# Patient Record
Sex: FEMALE | Race: WHITE | NOT HISPANIC OR LATINO | Employment: OTHER | URBAN - METROPOLITAN AREA
[De-identification: names, ages, dates, MRNs, and addresses within clinical notes are randomized per-mention and may not be internally consistent; named-entity substitution may affect disease eponyms.]

---

## 2023-01-01 ENCOUNTER — APPOINTMENT (OUTPATIENT)
Dept: PHYSICAL THERAPY | Facility: CLINIC | Age: 83
DRG: 640 | End: 2023-01-01
Attending: PHYSICIAN ASSISTANT
Payer: MEDICARE

## 2023-01-01 ENCOUNTER — HOSPITAL ENCOUNTER (OUTPATIENT)
Dept: CT IMAGING | Facility: CLINIC | Age: 83
Discharge: HOME | End: 2023-07-12
Attending: RADIOLOGY | Admitting: RADIOLOGY
Payer: MEDICARE

## 2023-01-01 ENCOUNTER — HOSPITAL ENCOUNTER (INPATIENT)
Facility: CLINIC | Age: 83
LOS: 7 days | Discharge: HOSPICE | DRG: 640 | End: 2023-05-16
Attending: EMERGENCY MEDICINE | Admitting: STUDENT IN AN ORGANIZED HEALTH CARE EDUCATION/TRAINING PROGRAM
Payer: MEDICARE

## 2023-01-01 ENCOUNTER — TELEPHONE (OUTPATIENT)
Dept: INTERVENTIONAL RADIOLOGY/VASCULAR | Facility: CLINIC | Age: 83
End: 2023-01-01
Payer: MEDICARE

## 2023-01-01 ENCOUNTER — TRANSCRIBE ORDERS (OUTPATIENT)
Dept: OTHER | Age: 83
End: 2023-01-01

## 2023-01-01 VITALS
HEIGHT: 65 IN | RESPIRATION RATE: 16 BRPM | SYSTOLIC BLOOD PRESSURE: 93 MMHG | TEMPERATURE: 98.3 F | WEIGHT: 145.9 LBS | BODY MASS INDEX: 24.31 KG/M2 | OXYGEN SATURATION: 96 % | HEART RATE: 124 BPM | DIASTOLIC BLOOD PRESSURE: 53 MMHG

## 2023-01-01 DIAGNOSIS — Z87.19 HISTORY OF DIVERTICULAR ABSCESS: ICD-10-CM

## 2023-01-01 DIAGNOSIS — E86.0 DEHYDRATION: ICD-10-CM

## 2023-01-01 DIAGNOSIS — K51.90 CHRONIC ULCERATIVE COLITIS WITHOUT COMPLICATION, UNSPECIFIED LOCATION (H): ICD-10-CM

## 2023-01-01 DIAGNOSIS — R52 PAIN: ICD-10-CM

## 2023-01-01 DIAGNOSIS — Z51.5 HOSPICE CARE PATIENT: Primary | ICD-10-CM

## 2023-01-01 DIAGNOSIS — K57.32 DIVERTICULITIS OF COLON: ICD-10-CM

## 2023-01-01 DIAGNOSIS — R50.9 FEVER, UNSPECIFIED FEVER CAUSE: ICD-10-CM

## 2023-01-01 DIAGNOSIS — I48.21 PERMANENT ATRIAL FIBRILLATION (H): ICD-10-CM

## 2023-01-01 DIAGNOSIS — D64.9 ANEMIA, UNSPECIFIED TYPE: ICD-10-CM

## 2023-01-01 DIAGNOSIS — F41.8 MIXED ANXIETY DEPRESSIVE DISORDER: ICD-10-CM

## 2023-01-01 DIAGNOSIS — Z87.19 HISTORY OF DIVERTICULAR ABSCESS: Primary | ICD-10-CM

## 2023-01-01 DIAGNOSIS — I95.9 HYPOTENSION, UNSPECIFIED HYPOTENSION TYPE: ICD-10-CM

## 2023-01-01 LAB
ABO/RH(D): NORMAL
ALBUMIN SERPL BCG-MCNC: 2.8 G/DL (ref 3.5–5.2)
ALBUMIN UR-MCNC: NEGATIVE MG/DL
ALP SERPL-CCNC: 146 U/L (ref 35–104)
ALT SERPL W P-5'-P-CCNC: 16 U/L (ref 10–35)
ANION GAP SERPL CALCULATED.3IONS-SCNC: 7 MMOL/L (ref 7–15)
ANION GAP SERPL CALCULATED.3IONS-SCNC: 8 MMOL/L (ref 7–15)
ANION GAP SERPL CALCULATED.3IONS-SCNC: 8 MMOL/L (ref 7–15)
ANTIBODY SCREEN: NEGATIVE
APPEARANCE UR: CLEAR
AST SERPL W P-5'-P-CCNC: 23 U/L (ref 10–35)
BACTERIA #/AREA URNS HPF: ABNORMAL /HPF
BACTERIA BLD CULT: NO GROWTH
BACTERIA BLD CULT: NO GROWTH
BASOPHILS # BLD AUTO: 0 10E3/UL (ref 0–0.2)
BASOPHILS # BLD AUTO: 0 10E3/UL (ref 0–0.2)
BASOPHILS # BLD MANUAL: 0 10E3/UL (ref 0–0.2)
BASOPHILS # BLD MANUAL: 0 10E3/UL (ref 0–0.2)
BASOPHILS NFR BLD AUTO: 0 %
BASOPHILS NFR BLD AUTO: 0 %
BASOPHILS NFR BLD MANUAL: 0 %
BASOPHILS NFR BLD MANUAL: 0 %
BILIRUB SERPL-MCNC: 0.5 MG/DL
BILIRUB UR QL STRIP: NEGATIVE
BLD PROD TYP BPU: NORMAL
BLOOD COMPONENT TYPE: NORMAL
BUN SERPL-MCNC: 13.5 MG/DL (ref 8–23)
BUN SERPL-MCNC: 6.4 MG/DL (ref 8–23)
BUN SERPL-MCNC: 9 MG/DL (ref 8–23)
CALCIUM SERPL-MCNC: 8.7 MG/DL (ref 8.8–10.2)
CALCIUM SERPL-MCNC: 8.8 MG/DL (ref 8.8–10.2)
CALCIUM SERPL-MCNC: 9.1 MG/DL (ref 8.8–10.2)
CHLORIDE SERPL-SCNC: 106 MMOL/L (ref 98–107)
CHLORIDE SERPL-SCNC: 109 MMOL/L (ref 98–107)
CHLORIDE SERPL-SCNC: 111 MMOL/L (ref 98–107)
CODING SYSTEM: NORMAL
COLOR UR AUTO: YELLOW
CREAT SERPL-MCNC: 0.53 MG/DL (ref 0.51–0.95)
CREAT SERPL-MCNC: 0.64 MG/DL (ref 0.51–0.95)
CREAT SERPL-MCNC: 0.77 MG/DL (ref 0.51–0.95)
CROSSMATCH: NORMAL
DEPRECATED HCO3 PLAS-SCNC: 21 MMOL/L (ref 22–29)
DEPRECATED HCO3 PLAS-SCNC: 21 MMOL/L (ref 22–29)
DEPRECATED HCO3 PLAS-SCNC: 22 MMOL/L (ref 22–29)
ELLIPTOCYTES BLD QL SMEAR: SLIGHT
ELLIPTOCYTES BLD QL SMEAR: SLIGHT
EOSINOPHIL # BLD AUTO: 0 10E3/UL (ref 0–0.7)
EOSINOPHIL # BLD AUTO: 0 10E3/UL (ref 0–0.7)
EOSINOPHIL # BLD MANUAL: 0 10E3/UL (ref 0–0.7)
EOSINOPHIL # BLD MANUAL: 0 10E3/UL (ref 0–0.7)
EOSINOPHIL NFR BLD AUTO: 0 %
EOSINOPHIL NFR BLD AUTO: 1 %
EOSINOPHIL NFR BLD MANUAL: 0 %
EOSINOPHIL NFR BLD MANUAL: 2 %
ERYTHROCYTE [DISTWIDTH] IN BLOOD BY AUTOMATED COUNT: 20.6 % (ref 10–15)
ERYTHROCYTE [DISTWIDTH] IN BLOOD BY AUTOMATED COUNT: 21.7 % (ref 10–15)
ERYTHROCYTE [DISTWIDTH] IN BLOOD BY AUTOMATED COUNT: 22 % (ref 10–15)
ERYTHROCYTE [DISTWIDTH] IN BLOOD BY AUTOMATED COUNT: 22.1 % (ref 10–15)
ERYTHROCYTE [DISTWIDTH] IN BLOOD BY AUTOMATED COUNT: 22.3 % (ref 10–15)
GFR SERPL CREATININE-BSD FRML MDRD: 75 ML/MIN/1.73M2
GFR SERPL CREATININE-BSD FRML MDRD: 86 ML/MIN/1.73M2
GFR SERPL CREATININE-BSD FRML MDRD: 90 ML/MIN/1.73M2
GLUCOSE SERPL-MCNC: 106 MG/DL (ref 70–99)
GLUCOSE SERPL-MCNC: 128 MG/DL (ref 70–99)
GLUCOSE SERPL-MCNC: 94 MG/DL (ref 70–99)
GLUCOSE UR STRIP-MCNC: NEGATIVE MG/DL
HCT VFR BLD AUTO: 21.7 % (ref 35–47)
HCT VFR BLD AUTO: 23.1 % (ref 35–47)
HCT VFR BLD AUTO: 24.4 % (ref 35–47)
HCT VFR BLD AUTO: 24.4 % (ref 35–47)
HCT VFR BLD AUTO: 24.5 % (ref 35–47)
HGB BLD-MCNC: 6.9 G/DL (ref 11.7–15.7)
HGB BLD-MCNC: 7.3 G/DL (ref 11.7–15.7)
HGB BLD-MCNC: 7.6 G/DL (ref 11.7–15.7)
HGB BLD-MCNC: 7.8 G/DL (ref 11.7–15.7)
HGB BLD-MCNC: 8 G/DL (ref 11.7–15.7)
HGB UR QL STRIP: ABNORMAL
IMM GRANULOCYTES # BLD: 0 10E3/UL
IMM GRANULOCYTES # BLD: 0 10E3/UL
IMM GRANULOCYTES NFR BLD: 1 %
IMM GRANULOCYTES NFR BLD: 2 %
ISSUE DATE AND TIME: NORMAL
KETONES UR STRIP-MCNC: NEGATIVE MG/DL
LACTATE SERPL-SCNC: 0.9 MMOL/L (ref 0.7–2)
LACTATE SERPL-SCNC: 0.9 MMOL/L (ref 0.7–2)
LACTATE SERPL-SCNC: 1.2 MMOL/L (ref 0.7–2)
LACTATE SERPL-SCNC: 1.2 MMOL/L (ref 0.7–2)
LACTATE SERPL-SCNC: 1.3 MMOL/L (ref 0.7–2)
LEUKOCYTE ESTERASE UR QL STRIP: NEGATIVE
LYMPHOCYTES # BLD AUTO: 0.5 10E3/UL (ref 0.8–5.3)
LYMPHOCYTES # BLD AUTO: 0.8 10E3/UL (ref 0.8–5.3)
LYMPHOCYTES # BLD MANUAL: 0.8 10E3/UL (ref 0.8–5.3)
LYMPHOCYTES # BLD MANUAL: 0.8 10E3/UL (ref 0.8–5.3)
LYMPHOCYTES NFR BLD AUTO: 35 %
LYMPHOCYTES NFR BLD AUTO: 67 %
LYMPHOCYTES NFR BLD MANUAL: 56 %
LYMPHOCYTES NFR BLD MANUAL: 63 %
MCH RBC QN AUTO: 30.3 PG (ref 26.5–33)
MCH RBC QN AUTO: 30.5 PG (ref 26.5–33)
MCH RBC QN AUTO: 30.6 PG (ref 26.5–33)
MCH RBC QN AUTO: 31.3 PG (ref 26.5–33)
MCH RBC QN AUTO: 31.9 PG (ref 26.5–33)
MCHC RBC AUTO-ENTMCNC: 31.1 G/DL (ref 31.5–36.5)
MCHC RBC AUTO-ENTMCNC: 31.6 G/DL (ref 31.5–36.5)
MCHC RBC AUTO-ENTMCNC: 31.8 G/DL (ref 31.5–36.5)
MCHC RBC AUTO-ENTMCNC: 31.8 G/DL (ref 31.5–36.5)
MCHC RBC AUTO-ENTMCNC: 32.8 G/DL (ref 31.5–36.5)
MCV RBC AUTO: 101 FL (ref 78–100)
MCV RBC AUTO: 95 FL (ref 78–100)
MCV RBC AUTO: 96 FL (ref 78–100)
MCV RBC AUTO: 97 FL (ref 78–100)
MCV RBC AUTO: 97 FL (ref 78–100)
MONOCYTES # BLD AUTO: 0.1 10E3/UL (ref 0–1.3)
MONOCYTES # BLD AUTO: 0.2 10E3/UL (ref 0–1.3)
MONOCYTES # BLD MANUAL: 0.1 10E3/UL (ref 0–1.3)
MONOCYTES # BLD MANUAL: 0.1 10E3/UL (ref 0–1.3)
MONOCYTES NFR BLD AUTO: 11 %
MONOCYTES NFR BLD AUTO: 15 %
MONOCYTES NFR BLD MANUAL: 10 %
MONOCYTES NFR BLD MANUAL: 6 %
MUCOUS THREADS #/AREA URNS LPF: PRESENT /LPF
NEUTROPHILS # BLD AUTO: 0.3 10E3/UL (ref 1.6–8.3)
NEUTROPHILS # BLD AUTO: 0.6 10E3/UL (ref 1.6–8.3)
NEUTROPHILS # BLD MANUAL: 0.3 10E3/UL (ref 1.6–8.3)
NEUTROPHILS # BLD MANUAL: 0.5 10E3/UL (ref 1.6–8.3)
NEUTROPHILS NFR BLD AUTO: 20 %
NEUTROPHILS NFR BLD AUTO: 48 %
NEUTROPHILS NFR BLD MANUAL: 27 %
NEUTROPHILS NFR BLD MANUAL: 36 %
NITRATE UR QL: NEGATIVE
NRBC # BLD AUTO: 0 10E3/UL
NRBC BLD AUTO-RTO: 0 /100
NRBC BLD AUTO-RTO: 0 /100
NRBC BLD MANUAL-RTO: 1 %
PATH REV: ABNORMAL
PH UR STRIP: 5 [PH] (ref 5–7)
PLAT MORPH BLD: ABNORMAL
PLAT MORPH BLD: ABNORMAL
PLATELET # BLD AUTO: 199 10E3/UL (ref 150–450)
PLATELET # BLD AUTO: 220 10E3/UL (ref 150–450)
PLATELET # BLD AUTO: 234 10E3/UL (ref 150–450)
PLATELET # BLD AUTO: 254 10E3/UL (ref 150–450)
PLATELET # BLD AUTO: 256 10E3/UL (ref 150–450)
POTASSIUM SERPL-SCNC: 3.6 MMOL/L (ref 3.4–5.3)
POTASSIUM SERPL-SCNC: 4 MMOL/L (ref 3.4–5.3)
POTASSIUM SERPL-SCNC: 5 MMOL/L (ref 3.4–5.3)
PROT SERPL-MCNC: 6.1 G/DL (ref 6.4–8.3)
RBC # BLD AUTO: 2.16 10E6/UL (ref 3.8–5.2)
RBC # BLD AUTO: 2.39 10E6/UL (ref 3.8–5.2)
RBC # BLD AUTO: 2.51 10E6/UL (ref 3.8–5.2)
RBC # BLD AUTO: 2.55 10E6/UL (ref 3.8–5.2)
RBC # BLD AUTO: 2.56 10E6/UL (ref 3.8–5.2)
RBC MORPH BLD: ABNORMAL
RBC MORPH BLD: ABNORMAL
RBC URINE: 1 /HPF
SMUDGE CELLS BLD QL SMEAR: PRESENT
SODIUM SERPL-SCNC: 135 MMOL/L (ref 136–145)
SODIUM SERPL-SCNC: 139 MMOL/L (ref 136–145)
SODIUM SERPL-SCNC: 139 MMOL/L (ref 136–145)
SP GR UR STRIP: 1.01 (ref 1–1.03)
SPECIMEN EXPIRATION DATE: NORMAL
SQUAMOUS EPITHELIAL: 1 /HPF
TROPONIN T SERPL HS-MCNC: 23 NG/L
UNIT ABO/RH: NORMAL
UNIT NUMBER: NORMAL
UNIT STATUS: NORMAL
UNIT TYPE ISBT: 6200
UROBILINOGEN UR STRIP-MCNC: NORMAL MG/DL
VARIANT LYMPHS BLD QL SMEAR: PRESENT
WBC # BLD AUTO: 1.1 10E3/UL (ref 4–11)
WBC # BLD AUTO: 1.2 10E3/UL (ref 4–11)
WBC # BLD AUTO: 1.3 10E3/UL (ref 4–11)
WBC # BLD AUTO: 1.3 10E3/UL (ref 4–11)
WBC # BLD AUTO: 1.4 10E3/UL (ref 4–11)
WBC URINE: 2 /HPF

## 2023-01-01 PROCEDURE — 36415 COLL VENOUS BLD VENIPUNCTURE: CPT | Performed by: HOSPITALIST

## 2023-01-01 PROCEDURE — 87040 BLOOD CULTURE FOR BACTERIA: CPT | Performed by: STUDENT IN AN ORGANIZED HEALTH CARE EDUCATION/TRAINING PROGRAM

## 2023-01-01 PROCEDURE — 83605 ASSAY OF LACTIC ACID: CPT | Performed by: HOSPITALIST

## 2023-01-01 PROCEDURE — 96361 HYDRATE IV INFUSION ADD-ON: CPT

## 2023-01-01 PROCEDURE — 250N000013 HC RX MED GY IP 250 OP 250 PS 637: Performed by: STUDENT IN AN ORGANIZED HEALTH CARE EDUCATION/TRAINING PROGRAM

## 2023-01-01 PROCEDURE — 36415 COLL VENOUS BLD VENIPUNCTURE: CPT | Performed by: PHYSICIAN ASSISTANT

## 2023-01-01 PROCEDURE — 36415 COLL VENOUS BLD VENIPUNCTURE: CPT | Performed by: EMERGENCY MEDICINE

## 2023-01-01 PROCEDURE — 99233 SBSQ HOSP IP/OBS HIGH 50: CPT | Performed by: HOSPITALIST

## 2023-01-01 PROCEDURE — 85027 COMPLETE CBC AUTOMATED: CPT | Performed by: HOSPITALIST

## 2023-01-01 PROCEDURE — 250N000011 HC RX IP 250 OP 636: Performed by: HOSPITALIST

## 2023-01-01 PROCEDURE — 81001 URINALYSIS AUTO W/SCOPE: CPT | Performed by: EMERGENCY MEDICINE

## 2023-01-01 PROCEDURE — 250N000013 HC RX MED GY IP 250 OP 250 PS 637: Performed by: HOSPITALIST

## 2023-01-01 PROCEDURE — 250N000011 HC RX IP 250 OP 636: Performed by: EMERGENCY MEDICINE

## 2023-01-01 PROCEDURE — 99285 EMERGENCY DEPT VISIT HI MDM: CPT | Mod: 25

## 2023-01-01 PROCEDURE — 85025 COMPLETE CBC W/AUTO DIFF WBC: CPT | Performed by: HOSPITALIST

## 2023-01-01 PROCEDURE — 84484 ASSAY OF TROPONIN QUANT: CPT | Performed by: EMERGENCY MEDICINE

## 2023-01-01 PROCEDURE — 250N000013 HC RX MED GY IP 250 OP 250 PS 637: Performed by: NURSE PRACTITIONER

## 2023-01-01 PROCEDURE — 80053 COMPREHEN METABOLIC PANEL: CPT | Performed by: EMERGENCY MEDICINE

## 2023-01-01 PROCEDURE — 120N000001 HC R&B MED SURG/OB

## 2023-01-01 PROCEDURE — P9016 RBC LEUKOCYTES REDUCED: HCPCS | Performed by: EMERGENCY MEDICINE

## 2023-01-01 PROCEDURE — G1010 CDSM STANSON: HCPCS

## 2023-01-01 PROCEDURE — 99223 1ST HOSP IP/OBS HIGH 75: CPT | Mod: AI | Performed by: STUDENT IN AN ORGANIZED HEALTH CARE EDUCATION/TRAINING PROGRAM

## 2023-01-01 PROCEDURE — 83605 ASSAY OF LACTIC ACID: CPT | Performed by: PHYSICIAN ASSISTANT

## 2023-01-01 PROCEDURE — 250N000013 HC RX MED GY IP 250 OP 250 PS 637: Performed by: PHYSICIAN ASSISTANT

## 2023-01-01 PROCEDURE — 86923 COMPATIBILITY TEST ELECTRIC: CPT | Performed by: EMERGENCY MEDICINE

## 2023-01-01 PROCEDURE — 85007 BL SMEAR W/DIFF WBC COUNT: CPT | Performed by: HOSPITALIST

## 2023-01-01 PROCEDURE — 99222 1ST HOSP IP/OBS MODERATE 55: CPT | Performed by: NURSE PRACTITIONER

## 2023-01-01 PROCEDURE — 36415 COLL VENOUS BLD VENIPUNCTURE: CPT | Performed by: STUDENT IN AN ORGANIZED HEALTH CARE EDUCATION/TRAINING PROGRAM

## 2023-01-01 PROCEDURE — 99231 SBSQ HOSP IP/OBS SF/LOW 25: CPT | Performed by: PHYSICIAN ASSISTANT

## 2023-01-01 PROCEDURE — 96360 HYDRATION IV INFUSION INIT: CPT

## 2023-01-01 PROCEDURE — 85025 COMPLETE CBC W/AUTO DIFF WBC: CPT | Performed by: EMERGENCY MEDICINE

## 2023-01-01 PROCEDURE — 86850 RBC ANTIBODY SCREEN: CPT | Performed by: EMERGENCY MEDICINE

## 2023-01-01 PROCEDURE — 80048 BASIC METABOLIC PNL TOTAL CA: CPT | Performed by: HOSPITALIST

## 2023-01-01 PROCEDURE — 83605 ASSAY OF LACTIC ACID: CPT | Performed by: EMERGENCY MEDICINE

## 2023-01-01 PROCEDURE — 99239 HOSP IP/OBS DSCHRG MGMT >30: CPT | Performed by: PHYSICIAN ASSISTANT

## 2023-01-01 PROCEDURE — 80048 BASIC METABOLIC PNL TOTAL CA: CPT | Performed by: STUDENT IN AN ORGANIZED HEALTH CARE EDUCATION/TRAINING PROGRAM

## 2023-01-01 PROCEDURE — 85027 COMPLETE CBC AUTOMATED: CPT | Performed by: STUDENT IN AN ORGANIZED HEALTH CARE EDUCATION/TRAINING PROGRAM

## 2023-01-01 PROCEDURE — 97530 THERAPEUTIC ACTIVITIES: CPT | Mod: GP | Performed by: PHYSICAL THERAPIST

## 2023-01-01 PROCEDURE — 258N000003 HC RX IP 258 OP 636: Performed by: EMERGENCY MEDICINE

## 2023-01-01 PROCEDURE — 99232 SBSQ HOSP IP/OBS MODERATE 35: CPT | Performed by: NURSE PRACTITIONER

## 2023-01-01 PROCEDURE — 97161 PT EVAL LOW COMPLEX 20 MIN: CPT | Mod: GP | Performed by: PHYSICAL THERAPIST

## 2023-01-01 PROCEDURE — 99497 ADVNCD CARE PLAN 30 MIN: CPT | Mod: 25 | Performed by: NURSE PRACTITIONER

## 2023-01-01 PROCEDURE — 250N000009 HC RX 250: Performed by: RADIOLOGY

## 2023-01-01 RX ORDER — LEVOFLOXACIN 500 MG/1
500 TABLET, FILM COATED ORAL DAILY
Status: ON HOLD | COMMUNITY
End: 2023-01-01

## 2023-01-01 RX ORDER — LORAZEPAM 2 MG/ML
1 INJECTION INTRAMUSCULAR
Status: DISCONTINUED | OUTPATIENT
Start: 2023-01-01 | End: 2023-01-01 | Stop reason: HOSPADM

## 2023-01-01 RX ORDER — HEPARIN SODIUM 5000 [USP'U]/.5ML
5000 INJECTION, SOLUTION INTRAVENOUS; SUBCUTANEOUS EVERY 8 HOURS
Status: DISCONTINUED | OUTPATIENT
Start: 2023-01-01 | End: 2023-01-01 | Stop reason: HOSPADM

## 2023-01-01 RX ORDER — VANCOMYCIN HYDROCHLORIDE 125 MG/1
125 CAPSULE ORAL 2 TIMES DAILY
Status: DISCONTINUED | OUTPATIENT
Start: 2023-01-01 | End: 2023-01-01

## 2023-01-01 RX ORDER — ONDANSETRON 2 MG/ML
4 INJECTION INTRAMUSCULAR; INTRAVENOUS EVERY 6 HOURS PRN
Status: DISCONTINUED | OUTPATIENT
Start: 2023-01-01 | End: 2023-01-01 | Stop reason: HOSPADM

## 2023-01-01 RX ORDER — HALOPERIDOL 2 MG/ML
1 SOLUTION ORAL
Qty: 15 ML | Refills: 0 | Status: CANCELLED | OUTPATIENT
Start: 2023-01-01

## 2023-01-01 RX ORDER — MORPHINE SULFATE 20 MG/ML
2.5-5 SOLUTION ORAL
Qty: 30 ML | Refills: 0 | Status: SHIPPED | OUTPATIENT
Start: 2023-01-01 | End: 2023-01-01

## 2023-01-01 RX ORDER — MESALAMINE 1.2 G/1
2.4 TABLET, DELAYED RELEASE ORAL DAILY
Status: DISCONTINUED | OUTPATIENT
Start: 2023-01-01 | End: 2023-01-01 | Stop reason: HOSPADM

## 2023-01-01 RX ORDER — BISACODYL 10 MG
10 SUPPOSITORY, RECTAL RECTAL DAILY PRN
Qty: 2 SUPPOSITORY | Refills: 0 | Status: CANCELLED | OUTPATIENT
Start: 2023-01-01

## 2023-01-01 RX ORDER — LORAZEPAM 0.5 MG/1
1 TABLET ORAL EVERY 4 HOURS PRN
Qty: 15 TABLET | Refills: 0 | Status: SHIPPED | OUTPATIENT
Start: 2023-01-01 | End: 2023-01-01

## 2023-01-01 RX ORDER — VANCOMYCIN HYDROCHLORIDE 125 MG/1
125 CAPSULE ORAL 2 TIMES DAILY
Status: DISCONTINUED | OUTPATIENT
Start: 2023-01-01 | End: 2023-01-01 | Stop reason: HOSPADM

## 2023-01-01 RX ORDER — ONDANSETRON 4 MG/1
4 TABLET, FILM COATED ORAL EVERY 8 HOURS PRN
Status: ON HOLD | COMMUNITY
Start: 2023-01-01 | End: 2023-01-01

## 2023-01-01 RX ORDER — ONDANSETRON 4 MG/1
4 TABLET, ORALLY DISINTEGRATING ORAL EVERY 6 HOURS PRN
Qty: 15 TABLET | Refills: 0 | Status: CANCELLED | OUTPATIENT
Start: 2023-01-01

## 2023-01-01 RX ORDER — HALOPERIDOL 0.5 MG/1
0.5 TABLET ORAL EVERY 6 HOURS PRN
Qty: 15 TABLET | Refills: 0 | DISCHARGE
Start: 2023-01-01

## 2023-01-01 RX ORDER — ONDANSETRON 4 MG/1
4 TABLET, ORALLY DISINTEGRATING ORAL EVERY 6 HOURS PRN
Status: DISCONTINUED | OUTPATIENT
Start: 2023-01-01 | End: 2023-01-01 | Stop reason: HOSPADM

## 2023-01-01 RX ORDER — METOPROLOL TARTRATE 25 MG/1
12.5 TABLET, FILM COATED ORAL 2 TIMES DAILY
DISCHARGE
Start: 2023-01-01 | End: 2023-01-01

## 2023-01-01 RX ORDER — SODIUM CHLORIDE 9 MG/ML
INJECTION, SOLUTION INTRAVENOUS CONTINUOUS
Status: DISCONTINUED | OUTPATIENT
Start: 2023-01-01 | End: 2023-01-01

## 2023-01-01 RX ORDER — MORPHINE SULFATE 20 MG/ML
2.5 SOLUTION ORAL
Qty: 30 ML | Refills: 0 | Status: CANCELLED | OUTPATIENT
Start: 2023-01-01

## 2023-01-01 RX ORDER — POTASSIUM CHLORIDE 1500 MG/1
20 TABLET, EXTENDED RELEASE ORAL 2 TIMES DAILY
Status: ON HOLD | COMMUNITY
Start: 2023-01-01 | End: 2023-01-01

## 2023-01-01 RX ORDER — LORAZEPAM 0.5 MG/1
0.5 TABLET ORAL EVERY 4 HOURS PRN
Qty: 15 TABLET | Refills: 0 | Status: SHIPPED | OUTPATIENT
Start: 2023-01-01

## 2023-01-01 RX ORDER — ACETAMINOPHEN 325 MG/1
975 TABLET ORAL EVERY 4 HOURS PRN
Status: DISCONTINUED | OUTPATIENT
Start: 2023-01-01 | End: 2023-01-01 | Stop reason: HOSPADM

## 2023-01-01 RX ORDER — MESALAMINE 1.2 G/1
2 TABLET, DELAYED RELEASE ORAL DAILY
Status: ON HOLD | COMMUNITY
Start: 2023-01-01 | End: 2023-01-01

## 2023-01-01 RX ORDER — HALOPERIDOL 2 MG/ML
1 SOLUTION ORAL
Status: DISCONTINUED | OUTPATIENT
Start: 2023-01-01 | End: 2023-01-01 | Stop reason: HOSPADM

## 2023-01-01 RX ORDER — CITALOPRAM HYDROBROMIDE 20 MG/1
20 TABLET ORAL AT BEDTIME
Status: ON HOLD | COMMUNITY
Start: 2023-01-01 | End: 2023-01-01

## 2023-01-01 RX ORDER — ONDANSETRON 2 MG/ML
4 INJECTION INTRAMUSCULAR; INTRAVENOUS EVERY 30 MIN PRN
Status: DISCONTINUED | OUTPATIENT
Start: 2023-01-01 | End: 2023-01-01

## 2023-01-01 RX ORDER — BISACODYL 10 MG
10 SUPPOSITORY, RECTAL RECTAL
Status: DISCONTINUED | OUTPATIENT
Start: 2023-01-01 | End: 2023-01-01 | Stop reason: HOSPADM

## 2023-01-01 RX ORDER — ACETAMINOPHEN 650 MG/1
650 SUPPOSITORY RECTAL EVERY 4 HOURS PRN
Qty: 2 SUPPOSITORY | Refills: 0 | DISCHARGE
Start: 2023-01-01

## 2023-01-01 RX ORDER — ACETAMINOPHEN 650 MG/1
650 SUPPOSITORY RECTAL EVERY 4 HOURS PRN
Qty: 2 SUPPOSITORY | Refills: 0 | Status: CANCELLED | OUTPATIENT
Start: 2023-01-01

## 2023-01-01 RX ORDER — ATROPINE SULFATE 10 MG/ML
2 SOLUTION/ DROPS OPHTHALMIC
Qty: 2 ML | Refills: 0 | DISCHARGE
Start: 2023-01-01

## 2023-01-01 RX ORDER — LIDOCAINE 40 MG/G
CREAM TOPICAL
Status: DISCONTINUED | OUTPATIENT
Start: 2023-01-01 | End: 2023-01-01 | Stop reason: HOSPADM

## 2023-01-01 RX ORDER — NALOXONE HYDROCHLORIDE 0.4 MG/ML
0.1 INJECTION, SOLUTION INTRAMUSCULAR; INTRAVENOUS; SUBCUTANEOUS
Status: DISCONTINUED | OUTPATIENT
Start: 2023-01-01 | End: 2023-01-01 | Stop reason: HOSPADM

## 2023-01-01 RX ORDER — LORAZEPAM 1 MG/1
1 TABLET ORAL
Qty: 15 TABLET | Refills: 0 | Status: CANCELLED | OUTPATIENT
Start: 2023-01-01

## 2023-01-01 RX ORDER — BISACODYL 10 MG
10 SUPPOSITORY, RECTAL RECTAL
Qty: 2 SUPPOSITORY | Refills: 0 | DISCHARGE
Start: 2023-01-01

## 2023-01-01 RX ORDER — CITALOPRAM HYDROBROMIDE 10 MG/1
20 TABLET ORAL DAILY
Status: DISCONTINUED | OUTPATIENT
Start: 2023-01-01 | End: 2023-01-01 | Stop reason: HOSPADM

## 2023-01-01 RX ORDER — CALCIUM CARBONATE/VITAMIN D3 600 MG-10
1 TABLET ORAL 2 TIMES DAILY
Status: ON HOLD | COMMUNITY
End: 2023-01-01

## 2023-01-01 RX ORDER — ATROPINE SULFATE 10 MG/ML
2 SOLUTION/ DROPS OPHTHALMIC EVERY 4 HOURS PRN
Status: DISCONTINUED | OUTPATIENT
Start: 2023-01-01 | End: 2023-01-01 | Stop reason: HOSPADM

## 2023-01-01 RX ORDER — ONDANSETRON 4 MG/1
4 TABLET, ORALLY DISINTEGRATING ORAL EVERY 6 HOURS PRN
Qty: 15 TABLET | Refills: 0 | DISCHARGE
Start: 2023-01-01

## 2023-01-01 RX ORDER — FLUCONAZOLE 200 MG/1
200 TABLET ORAL DAILY
Status: ON HOLD | COMMUNITY
End: 2023-01-01

## 2023-01-01 RX ORDER — ACETAMINOPHEN 650 MG/1
650 SUPPOSITORY RECTAL EVERY 4 HOURS PRN
Status: DISCONTINUED | OUTPATIENT
Start: 2023-01-01 | End: 2023-01-01 | Stop reason: HOSPADM

## 2023-01-01 RX ORDER — LORAZEPAM 1 MG/1
1 TABLET ORAL
Status: DISCONTINUED | OUTPATIENT
Start: 2023-01-01 | End: 2023-01-01 | Stop reason: HOSPADM

## 2023-01-01 RX ORDER — ACETAMINOPHEN 325 MG/1
975 TABLET ORAL EVERY 4 HOURS PRN
Qty: 30 TABLET | Refills: 0 | DISCHARGE
Start: 2023-01-01

## 2023-01-01 RX ORDER — VANCOMYCIN HYDROCHLORIDE 125 MG/1
125 CAPSULE ORAL
Status: ON HOLD | COMMUNITY
Start: 2023-01-01 | End: 2023-01-01

## 2023-01-01 RX ORDER — VANCOMYCIN HYDROCHLORIDE 125 MG/1
125 CAPSULE ORAL 2 TIMES DAILY
DISCHARGE
Start: 2023-01-01

## 2023-01-01 RX ORDER — METOPROLOL TARTRATE 25 MG/1
12.5 TABLET, FILM COATED ORAL 2 TIMES DAILY
DISCHARGE
Start: 2023-01-01

## 2023-01-01 RX ORDER — LOPERAMIDE HCL 2 MG
2 CAPSULE ORAL 4 TIMES DAILY PRN
Status: ON HOLD | COMMUNITY
Start: 2023-01-01 | End: 2023-01-01

## 2023-01-01 RX ORDER — MESALAMINE 1.2 G/1
2.4 TABLET, DELAYED RELEASE ORAL DAILY
DISCHARGE
Start: 2023-01-01

## 2023-01-01 RX ORDER — METOPROLOL TARTRATE 100 MG
100 TABLET ORAL 2 TIMES DAILY
Status: ON HOLD | COMMUNITY
Start: 2023-01-01 | End: 2023-01-01

## 2023-01-01 RX ORDER — ATROPINE SULFATE 10 MG/ML
2 SOLUTION/ DROPS OPHTHALMIC
Qty: 2 ML | Refills: 0 | Status: CANCELLED | OUTPATIENT
Start: 2023-01-01

## 2023-01-01 RX ORDER — ACYCLOVIR 400 MG/1
400 TABLET ORAL 2 TIMES DAILY
Status: ON HOLD | COMMUNITY
End: 2023-01-01

## 2023-01-01 RX ORDER — NALOXONE HYDROCHLORIDE 0.4 MG/ML
0.2 INJECTION, SOLUTION INTRAMUSCULAR; INTRAVENOUS; SUBCUTANEOUS
Status: DISCONTINUED | OUTPATIENT
Start: 2023-01-01 | End: 2023-01-01 | Stop reason: HOSPADM

## 2023-01-01 RX ORDER — HYDROCHLOROTHIAZIDE 25 MG/1
TABLET ORAL
Status: ON HOLD | COMMUNITY
Start: 2023-01-01 | End: 2023-01-01

## 2023-01-01 RX ORDER — RIVAROXABAN 20 MG/1
20 TABLET, FILM COATED ORAL
Status: ON HOLD | COMMUNITY
Start: 2023-01-01 | End: 2023-01-01

## 2023-01-01 RX ORDER — SODIUM CHLORIDE, SODIUM LACTATE, POTASSIUM CHLORIDE, CALCIUM CHLORIDE 600; 310; 30; 20 MG/100ML; MG/100ML; MG/100ML; MG/100ML
INJECTION, SOLUTION INTRAVENOUS CONTINUOUS
Status: DISCONTINUED | OUTPATIENT
Start: 2023-01-01 | End: 2023-01-01

## 2023-01-01 RX ORDER — CITALOPRAM HYDROBROMIDE 20 MG/1
20 TABLET ORAL AT BEDTIME
DISCHARGE
Start: 2023-01-01

## 2023-01-01 RX ORDER — MORPHINE SULFATE 20 MG/ML
2.5-5 SOLUTION ORAL
Status: DISCONTINUED | OUTPATIENT
Start: 2023-01-01 | End: 2023-01-01 | Stop reason: HOSPADM

## 2023-01-01 RX ORDER — MORPHINE SULFATE 20 MG/ML
2.5 SOLUTION ORAL
Qty: 30 ML | Refills: 0 | Status: SHIPPED | OUTPATIENT
Start: 2023-01-01

## 2023-01-01 RX ADMIN — CITALOPRAM HYDROBROMIDE 20 MG: 10 TABLET ORAL at 20:20

## 2023-01-01 RX ADMIN — VANCOMYCIN HYDROCHLORIDE 125 MG: 125 CAPSULE ORAL at 09:35

## 2023-01-01 RX ADMIN — AMOXICILLIN AND CLAVULANATE POTASSIUM 1 TABLET: 875; 125 TABLET, FILM COATED ORAL at 20:20

## 2023-01-01 RX ADMIN — HEPARIN SODIUM 5000 UNITS: 5000 INJECTION, SOLUTION INTRAVENOUS; SUBCUTANEOUS at 13:13

## 2023-01-01 RX ADMIN — MESALAMINE 2.4 G: 1.2 TABLET, DELAYED RELEASE ORAL at 09:12

## 2023-01-01 RX ADMIN — CITALOPRAM HYDROBROMIDE 20 MG: 10 TABLET ORAL at 21:41

## 2023-01-01 RX ADMIN — ACETAMINOPHEN 975 MG: 325 TABLET ORAL at 12:40

## 2023-01-01 RX ADMIN — VANCOMYCIN HYDROCHLORIDE 125 MG: 125 CAPSULE ORAL at 19:58

## 2023-01-01 RX ADMIN — METOPROLOL TARTRATE 12.5 MG: 25 TABLET, FILM COATED ORAL at 08:27

## 2023-01-01 RX ADMIN — MESALAMINE 2.4 G: 1.2 TABLET, DELAYED RELEASE ORAL at 09:57

## 2023-01-01 RX ADMIN — AMOXICILLIN AND CLAVULANATE POTASSIUM 1 TABLET: 875; 125 TABLET, FILM COATED ORAL at 08:41

## 2023-01-01 RX ADMIN — HEPARIN SODIUM 5000 UNITS: 5000 INJECTION, SOLUTION INTRAVENOUS; SUBCUTANEOUS at 11:12

## 2023-01-01 RX ADMIN — HEPARIN SODIUM 5000 UNITS: 5000 INJECTION, SOLUTION INTRAVENOUS; SUBCUTANEOUS at 20:20

## 2023-01-01 RX ADMIN — HEPARIN SODIUM 5000 UNITS: 5000 INJECTION, SOLUTION INTRAVENOUS; SUBCUTANEOUS at 04:13

## 2023-01-01 RX ADMIN — ACETAMINOPHEN 975 MG: 325 TABLET ORAL at 15:02

## 2023-01-01 RX ADMIN — HEPARIN SODIUM 5000 UNITS: 5000 INJECTION, SOLUTION INTRAVENOUS; SUBCUTANEOUS at 18:49

## 2023-01-01 RX ADMIN — CITALOPRAM HYDROBROMIDE 20 MG: 10 TABLET ORAL at 21:47

## 2023-01-01 RX ADMIN — VANCOMYCIN HYDROCHLORIDE 125 MG: 125 CAPSULE ORAL at 09:16

## 2023-01-01 RX ADMIN — HEPARIN SODIUM 5000 UNITS: 5000 INJECTION, SOLUTION INTRAVENOUS; SUBCUTANEOUS at 04:57

## 2023-01-01 RX ADMIN — MESALAMINE 2.4 G: 1.2 TABLET, DELAYED RELEASE ORAL at 09:16

## 2023-01-01 RX ADMIN — AMOXICILLIN AND CLAVULANATE POTASSIUM 1 TABLET: 875; 125 TABLET, FILM COATED ORAL at 09:16

## 2023-01-01 RX ADMIN — AMOXICILLIN AND CLAVULANATE POTASSIUM 1 TABLET: 875; 125 TABLET, FILM COATED ORAL at 09:12

## 2023-01-01 RX ADMIN — ACETAMINOPHEN 975 MG: 325 TABLET ORAL at 06:14

## 2023-01-01 RX ADMIN — AMOXICILLIN AND CLAVULANATE POTASSIUM 1 TABLET: 875; 125 TABLET, FILM COATED ORAL at 09:35

## 2023-01-01 RX ADMIN — SILVER NITRATE APPLICATORS 2 APPLICATOR: 25; 75 STICK TOPICAL at 13:34

## 2023-01-01 RX ADMIN — HEPARIN SODIUM 5000 UNITS: 5000 INJECTION, SOLUTION INTRAVENOUS; SUBCUTANEOUS at 02:56

## 2023-01-01 RX ADMIN — HEPARIN SODIUM 5000 UNITS: 5000 INJECTION, SOLUTION INTRAVENOUS; SUBCUTANEOUS at 12:27

## 2023-01-01 RX ADMIN — VANCOMYCIN HYDROCHLORIDE 125 MG: 125 CAPSULE ORAL at 09:12

## 2023-01-01 RX ADMIN — VANCOMYCIN HYDROCHLORIDE 125 MG: 125 CAPSULE ORAL at 09:57

## 2023-01-01 RX ADMIN — HEPARIN SODIUM 5000 UNITS: 5000 INJECTION, SOLUTION INTRAVENOUS; SUBCUTANEOUS at 11:27

## 2023-01-01 RX ADMIN — VANCOMYCIN HYDROCHLORIDE 125 MG: 125 CAPSULE ORAL at 20:18

## 2023-01-01 RX ADMIN — HEPARIN SODIUM 5000 UNITS: 5000 INJECTION, SOLUTION INTRAVENOUS; SUBCUTANEOUS at 19:58

## 2023-01-01 RX ADMIN — HEPARIN SODIUM 5000 UNITS: 5000 INJECTION, SOLUTION INTRAVENOUS; SUBCUTANEOUS at 03:57

## 2023-01-01 RX ADMIN — MESALAMINE 2.4 G: 1.2 TABLET, DELAYED RELEASE ORAL at 10:04

## 2023-01-01 RX ADMIN — SODIUM CHLORIDE: 9 INJECTION, SOLUTION INTRAVENOUS at 18:25

## 2023-01-01 RX ADMIN — HEPARIN SODIUM 5000 UNITS: 5000 INJECTION, SOLUTION INTRAVENOUS; SUBCUTANEOUS at 20:09

## 2023-01-01 RX ADMIN — CITALOPRAM HYDROBROMIDE 20 MG: 10 TABLET ORAL at 20:31

## 2023-01-01 RX ADMIN — MESALAMINE 2.4 G: 1.2 TABLET, DELAYED RELEASE ORAL at 09:35

## 2023-01-01 RX ADMIN — HEPARIN SODIUM 5000 UNITS: 5000 INJECTION, SOLUTION INTRAVENOUS; SUBCUTANEOUS at 13:07

## 2023-01-01 RX ADMIN — AMOXICILLIN AND CLAVULANATE POTASSIUM 1 TABLET: 875; 125 TABLET, FILM COATED ORAL at 08:27

## 2023-01-01 RX ADMIN — HEPARIN SODIUM 5000 UNITS: 5000 INJECTION, SOLUTION INTRAVENOUS; SUBCUTANEOUS at 20:31

## 2023-01-01 RX ADMIN — HEPARIN SODIUM 5000 UNITS: 5000 INJECTION, SOLUTION INTRAVENOUS; SUBCUTANEOUS at 18:50

## 2023-01-01 RX ADMIN — TAZOBACTAM SODIUM AND PIPERACILLIN SODIUM 3.38 G: 375; 3 INJECTION, SOLUTION INTRAVENOUS at 19:13

## 2023-01-01 RX ADMIN — AMOXICILLIN AND CLAVULANATE POTASSIUM 1 TABLET: 875; 125 TABLET, FILM COATED ORAL at 19:58

## 2023-01-01 RX ADMIN — AMOXICILLIN AND CLAVULANATE POTASSIUM 1 TABLET: 875; 125 TABLET, FILM COATED ORAL at 20:18

## 2023-01-01 RX ADMIN — CITALOPRAM HYDROBROMIDE 20 MG: 10 TABLET ORAL at 21:29

## 2023-01-01 RX ADMIN — SODIUM CHLORIDE 1000 ML: 9 INJECTION, SOLUTION INTRAVENOUS at 16:15

## 2023-01-01 RX ADMIN — MESALAMINE 2.4 G: 1.2 TABLET, DELAYED RELEASE ORAL at 08:27

## 2023-01-01 RX ADMIN — SODIUM CHLORIDE: 9 INJECTION, SOLUTION INTRAVENOUS at 00:22

## 2023-01-01 RX ADMIN — MESALAMINE 2.4 G: 1.2 TABLET, DELAYED RELEASE ORAL at 08:42

## 2023-01-01 RX ADMIN — CITALOPRAM HYDROBROMIDE 20 MG: 10 TABLET ORAL at 21:22

## 2023-01-01 RX ADMIN — VANCOMYCIN HYDROCHLORIDE 125 MG: 125 CAPSULE ORAL at 20:20

## 2023-01-01 RX ADMIN — AMOXICILLIN AND CLAVULANATE POTASSIUM 1 TABLET: 875; 125 TABLET, FILM COATED ORAL at 20:15

## 2023-01-01 RX ADMIN — AMOXICILLIN AND CLAVULANATE POTASSIUM 1 TABLET: 875; 125 TABLET, FILM COATED ORAL at 20:31

## 2023-01-01 RX ADMIN — VANCOMYCIN HYDROCHLORIDE 125 MG: 125 CAPSULE ORAL at 20:15

## 2023-01-01 RX ADMIN — HEPARIN SODIUM 5000 UNITS: 5000 INJECTION, SOLUTION INTRAVENOUS; SUBCUTANEOUS at 04:47

## 2023-01-01 RX ADMIN — METOPROLOL TARTRATE 12.5 MG: 25 TABLET, FILM COATED ORAL at 20:20

## 2023-01-01 RX ADMIN — HEPARIN SODIUM 5000 UNITS: 5000 INJECTION, SOLUTION INTRAVENOUS; SUBCUTANEOUS at 03:19

## 2023-01-01 RX ADMIN — VANCOMYCIN HYDROCHLORIDE 125 MG: 125 CAPSULE ORAL at 20:31

## 2023-01-01 RX ADMIN — AMOXICILLIN AND CLAVULANATE POTASSIUM 1 TABLET: 875; 125 TABLET, FILM COATED ORAL at 09:57

## 2023-01-01 RX ADMIN — ACETAMINOPHEN 975 MG: 325 TABLET ORAL at 19:58

## 2023-01-01 RX ADMIN — HEPARIN SODIUM 5000 UNITS: 5000 INJECTION, SOLUTION INTRAVENOUS; SUBCUTANEOUS at 13:59

## 2023-01-01 RX ADMIN — VANCOMYCIN HYDROCHLORIDE 125 MG: 125 CAPSULE ORAL at 08:27

## 2023-01-01 RX ADMIN — VANCOMYCIN HYDROCHLORIDE 125 MG: 125 CAPSULE ORAL at 08:42

## 2023-01-01 RX ADMIN — AMOXICILLIN AND CLAVULANATE POTASSIUM 1 TABLET: 875; 125 TABLET, FILM COATED ORAL at 10:04

## 2023-01-01 ASSESSMENT — ACTIVITIES OF DAILY LIVING (ADL)
ADLS_ACUITY_SCORE: 30
ADLS_ACUITY_SCORE: 26
ADLS_ACUITY_SCORE: 30
ADLS_ACUITY_SCORE: 43
ADLS_ACUITY_SCORE: 30
ADLS_ACUITY_SCORE: 37
ADLS_ACUITY_SCORE: 30
ADLS_ACUITY_SCORE: 26
ADLS_ACUITY_SCORE: 26
ADLS_ACUITY_SCORE: 30
ADLS_ACUITY_SCORE: 26
ADLS_ACUITY_SCORE: 30
ADLS_ACUITY_SCORE: 31
ADLS_ACUITY_SCORE: 30
ADLS_ACUITY_SCORE: 37
ADLS_ACUITY_SCORE: 30
ADLS_ACUITY_SCORE: 37
ADLS_ACUITY_SCORE: 37
ADLS_ACUITY_SCORE: 30
ADLS_ACUITY_SCORE: 37
ADLS_ACUITY_SCORE: 30
ADLS_ACUITY_SCORE: 30
ADLS_ACUITY_SCORE: 26
ADLS_ACUITY_SCORE: 30
ADLS_ACUITY_SCORE: 43
ADLS_ACUITY_SCORE: 26
ADLS_ACUITY_SCORE: 30
ADLS_ACUITY_SCORE: 30
ADLS_ACUITY_SCORE: 26
ADLS_ACUITY_SCORE: 26
ADLS_ACUITY_SCORE: 30
ADLS_ACUITY_SCORE: 26
ADLS_ACUITY_SCORE: 30
ADLS_ACUITY_SCORE: 31
ADLS_ACUITY_SCORE: 30
ADLS_ACUITY_SCORE: 26
ADLS_ACUITY_SCORE: 30
ADLS_ACUITY_SCORE: 30

## 2023-05-09 PROBLEM — E43 SEVERE PROTEIN-CALORIE MALNUTRITION (GOMEZ: LESS THAN 60% OF STANDARD WEIGHT) (H): Status: ACTIVE | Noted: 2023-01-01

## 2023-05-09 PROBLEM — D64.9 ANEMIA, UNSPECIFIED TYPE: Status: ACTIVE | Noted: 2023-01-01

## 2023-05-09 PROBLEM — I34.0 MITRAL VALVE INSUFFICIENCY: Status: ACTIVE | Noted: 2023-01-01

## 2023-05-09 PROBLEM — I48.21 PERMANENT ATRIAL FIBRILLATION (H): Status: ACTIVE | Noted: 2018-10-31

## 2023-05-09 PROBLEM — K57.32 DIVERTICULITIS OF COLON: Status: ACTIVE | Noted: 2023-01-01

## 2023-05-09 PROBLEM — R53.1 WEAKNESS: Status: ACTIVE | Noted: 2023-01-01

## 2023-05-09 PROBLEM — I47.10 SUPRAVENTRICULAR TACHYCARDIA (H): Status: ACTIVE | Noted: 2021-04-28

## 2023-05-09 PROBLEM — K57.90 DIVERTICULAR DISEASE: Status: ACTIVE | Noted: 2023-01-01

## 2023-05-09 PROBLEM — I95.9 HYPOTENSION, UNSPECIFIED HYPOTENSION TYPE: Status: ACTIVE | Noted: 2023-01-01

## 2023-05-09 PROBLEM — D72.819 LEUKOPENIA: Status: ACTIVE | Noted: 2023-01-01

## 2023-05-09 PROBLEM — K51.90 CHRONIC ULCERATIVE COLITIS (H): Status: ACTIVE | Noted: 2017-03-16

## 2023-05-09 PROBLEM — R63.0 LOSS OF APPETITE: Status: ACTIVE | Noted: 2023-01-01

## 2023-05-09 PROBLEM — A04.72 ENTEROCOLITIS DUE TO CLOSTRIDIUM DIFFICILE, NOT SPECIFIED AS RECURRENT: Status: ACTIVE | Noted: 2023-01-01

## 2023-05-09 PROBLEM — Z79.01 LONG TERM CURRENT USE OF ANTICOAGULANT THERAPY: Status: ACTIVE | Noted: 2023-01-01

## 2023-05-09 PROBLEM — Z91.81 HISTORY OF FALLING: Status: ACTIVE | Noted: 2018-11-12

## 2023-05-09 PROBLEM — E21.3 HYPERPARATHYROIDISM (H): Status: ACTIVE | Noted: 2023-01-01

## 2023-05-09 PROBLEM — F41.8 MIXED ANXIETY DEPRESSIVE DISORDER: Status: ACTIVE | Noted: 2023-01-01

## 2023-05-09 PROBLEM — C92.50: Status: ACTIVE | Noted: 2023-01-01

## 2023-05-09 PROBLEM — E86.0 DEHYDRATION: Status: ACTIVE | Noted: 2023-01-01

## 2023-05-09 PROBLEM — I10 PRIMARY HYPERTENSION: Status: ACTIVE | Noted: 2023-01-01

## 2023-05-09 PROBLEM — Z59.9 FINANCIAL DIFFICULTIES: Status: ACTIVE | Noted: 2023-01-01

## 2023-05-09 NOTE — ED TRIAGE NOTES
"Arrives via EMS from . Living facility where receives checks twice daily.  Coming in for pain at site of drain placed yesterday. Pain rating 2/10.  Recent 6 week stay at [facility] for leukemia and diverticulitis.  Has had dizziness over past 1 1/2 weeks and nausea. SBP= en route.  Patient denies light-headed. Patient states \"they've been having trouble with my blood pressure\".  Stating it has been low at [facility]. ABCD's intact; A/O x 4.       "

## 2023-05-09 NOTE — ED NOTES
Family arrived and have greater story regarding why patient is here. Patient has been declining in health since released home from [facility] on April 26. Has not been eating or drinking very much. At risk of falling, growing weaker and weaker.  Been having various pain. Level of care she is able to get at [facility] is not high enough for patient any longer. Family states was seen at [facility] yesterday to have pigtail drain #2 placed.  Did not have patient seen in ED or clinic.

## 2023-05-09 NOTE — ED PROVIDER NOTES
History     Chief Complaint:  Dehydration, Failure to Thrive, Fatigue, & Weakness       HPI   Noemí Meza is a 83 year old female s/p CT guided drain placement for a diverticular abscess yesterday who presents via EMS from assisted living with dehydration, failure to thrive, fatigue, and generalized weakness. Patient's family states that she has been declining over the last few weeks and has recently needed more care than is available at [facility] where she lives. Staff at this facility were also concerned for failure to thrive, dehydration, and malnourishment. Patient's family is seeking placement in a new facility where she can receive the cares she needs. Of note, Noemí was admitted to [facility] for several weeks in March/April and only recently moved to her current assisted living facility from [place]. Her family notes that she has been at [facility] long enough for the nursing staff to know her baseline physical health. Patient has a history of myelomonocytic leukemia though is not currently on chemotherapy due to her diverticulitis.       Independent Historian:   Sister & Daughter- They report history as noted above.     Review of External Notes: Yes, reviewed radiology note from yesterday where she was seen for a CT guided drain placement for a diverticular abscess.       Allergies:  No Known Allergies     Medications:    Lialda  Xarelto   Celexa  Lopressor  Zovirax   Diflucan   Imodium   Zofran   K-tab   Compazine   Senokot  Augmentin   Levaquin   Venclexta   Vancocin     Past Medical History:    Diverticulitis with abscess   Anorexia non psychogenic   HTN  Enterocolitis due to C. Difficile colitis   Chemotherapy induced anemia  Mitral regurgitation  Severe protein-calorie malnutrition    Myelomonocytic leukemia   SVT  Permanent atrial fibrillation   Chronic UC  CKD, stage 3a  Hyperparathyroidism   Depression   Anxiety   TIA    Past Surgical History:   "  Phacoemulsification cataract with IOL implantation, bilateral    Yttrium aluminium garnet capsulotomy, right  Cholecystectomy   Tonsillectomy  Appendectomy  Breast mass removal     Family History:    Father: CAD, prostate cancer  Mother: CAD, MI, stroke    Social History:  Arrives via EMS.   Accompanied by her sister and daughter.   Lives at [facility]     Physical Exam     Patient Vitals for the past 24 hrs:   BP Temp Temp src Pulse Resp SpO2 Height Weight   05/09/23 1900 90/67 98  F (36.7  C) Oral 73 18 97 % -- --   05/09/23 1822 (!) 81/58 -- -- 88 -- 97 % -- --   05/09/23 1722 (!) 89/57 -- -- -- -- 98 % -- --   05/09/23 1629 -- -- -- -- -- 98 % -- --   05/09/23 1614 (!) 83/59 -- -- 67 -- 95 % -- --   05/09/23 1600 (!) 86/64 98.3  F (36.8  C) Oral 82 18 100 % 1.651 m (5' 5\") 69.9 kg (154 lb)        Physical Exam  Vitals: reviewed by me  General: Pt seen on hospital [facility], cooperative, and alert to conversation.  Sick appearing however, slow to respond  Eyes: Tracking well, clear conjunctiva BL  ENT: Very dry mucous membranes, midline trachea.   Lungs: No tachypnea, no accessory muscle use. No respiratory distress.   CV: Rate as above  Abd: Soft, minimal discomfort to the left lower quadrant, no guarding, no rebound.  Drain appears to be in place  MSK: no joint effusion.  No evidence of trauma  Skin: No rash  Neuro: Clear speech and no facial droop.  Psych: Not RIS, no e/o AH/VH    Emergency Department Course     Laboratory:  Labs Ordered and Resulted from Time of ED Arrival to Time of ED Departure   COMPREHENSIVE METABOLIC PANEL - Abnormal       Result Value    Sodium 135 (*)     Potassium 5.0      Chloride 106      Carbon Dioxide (CO2) 21 (*)     Anion Gap 8      Urea Nitrogen 13.5      Creatinine 0.77      Calcium 9.1      Glucose 128 (*)     Alkaline Phosphatase 146 (*)     AST 23      ALT 16      Protein Total 6.1 (*)     Albumin 2.8 (*)     Bilirubin Total 0.5      GFR " Estimate 75     TROPONIN T, HIGH SENSITIVITY - Abnormal    Troponin T, High Sensitivity 23 (*)    ROUTINE UA WITH MICROSCOPIC REFLEX TO CULTURE - Abnormal    Color Urine Yellow      Appearance Urine Clear      Glucose Urine Negative      Bilirubin Urine Negative      Ketones Urine Negative      Specific Gravity Urine 1.011      Blood Urine Small (*)     pH Urine 5.0      Protein Albumin Urine Negative      Urobilinogen Urine Normal      Nitrite Urine Negative      Leukocyte Esterase Urine Negative      Bacteria Urine Few (*)     Mucus Urine Present (*)     RBC Urine 1      WBC Urine 2      Squamous Epithelials Urine 1     CBC WITH PLATELETS AND DIFFERENTIAL - Abnormal    WBC Count 1.3 (*)     RBC Count 2.16 (*)     Hemoglobin 6.9 (*)     Hematocrit 21.7 (*)      (*)     MCH 31.9      MCHC 31.8      RDW 20.6 (*)     Platelet Count 220      % Neutrophils 48      % Lymphocytes 35      % Monocytes 15      % Eosinophils 0      % Basophils 0      % Immature Granulocytes 2      NRBCs per 100 WBC 0      Absolute Neutrophils 0.6 (*)     Absolute Lymphocytes 0.5 (*)     Absolute Monocytes 0.2      Absolute Eosinophils 0.0      Absolute Basophils 0.0      Absolute Immature Granulocytes 0.0      Absolute NRBCs 0.0     LACTIC ACID WHOLE BLOOD - Normal    Lactic Acid 1.2     TYPE AND SCREEN, ADULT    ABO/RH(D) A POS      Antibody Screen Negative      SPECIMEN EXPIRATION DATE [id number]     PREPARE RED BLOOD CELLS (UNIT)    Blood Component Type Red Blood Cells      Product Code [id number]      Unit Status Ready for issue      Unit Number [id number]      CROSSMATCH Compatible      CODING SYSTEM [id number]     PREPARE RED BLOOD CELLS (UNIT)   BLOOD CULTURE   BLOOD CULTURE   ABO/RH TYPE AND SCREEN      Emergency Department Course & Assessments:       Interventions:  Medications   0.9% sodium chloride BOLUS (0 mLs Intravenous Stopped 5/9/23 1822)     Followed by   sodium chloride 0.9% infusion ( Intravenous $New Bag 5/9/23  1825)   ondansetron (ZOFRAN) injection 4 mg (has no administration in time range)   piperacillin-tazobactam (ZOSYN) infusion 3.375 g (3.375 g Intravenous $New Bag 5/9/23 1913)        Consultations/Discussion of Management or Tests:  1830 I spoke with Dr. Carrion, hospitalist, who accepts the patient.     Social Determinants of Health affecting care:   Transportation      Disposition:  The patient was admitted to the hospital under the care of Dr. Carrion.     Impression & Plan        Medical Decision Making:  This is a pleasant 3-year-old female who presents emergency room with what appears to be significant nutrition, anemia, and dehydration.  Her blood pressure was quite low but is coming up with fluids and blood, and I do think it is likely related to her malnourished and dehydrated state.  Her mucous memories are dry, but she is mentating appropriately, and thankfully her abdomen is benign.  It does sound like the procedure yesterday went well, and her drain is still draining I do not think that we need to reimage her abdomen such as no abdominal pain at this time and no history of pulling the drain out.  Family is quite concerned about the overall state of her health and does not feel comfortable with her going home at her assisted living facility, as they are unable to meet her needs.  We will plan for admission here, switch her to IV antibiotics, and admit to the care of the admitting hospitalist team here for ultimate placement after her anemia and malnutrition has been addressed, as well as after her dehydration has been addressed.  May need a TCU, will plan for careful monitoring until next inpatient bed is available.    Critical care time 35 minutes      Diagnosis:    ICD-10-CM    1. Anemia, unspecified type  D64.9       2. Dehydration  E86.0       3. Hypotension, unspecified hypotension type  I95.9       4. Diverticulitis of colon  K57.32            Scribe Disclosure:  I, Yaz Mccrary, am serving as a  scribe at 4:15 PM on 5/9/2023 to document services personally performed by Osvaldo Floyd MD based on my observations and the provider's statements to me.     5/9/2023   Osvaldo Floyd MD Fitzgerald, Michael Maxwell Kreofsky, MD  05/09/23 2021

## 2023-05-09 NOTE — ED NOTES
"[facility]  ED Nurse Handoff Report    ED Chief complaint: Generalized Weakness  . ED Diagnosis:   Final diagnoses:   None       Allergies: No Known Allergies    Code Status: Full Code    Activity level - Baseline/Home:  Regular activity.   Activity Level - Current:   Up with assist.   Lift room needed: No.   Bariatric: No   Needed: No   Isolation: Yes.   Infection: Not Applicable  Possible c-dif reports diarrhea.  No sample sent as of note writing at 1703    Respiratory status: Room air    Vital Signs (within 30 minutes):   Vitals:    05/09/23 1600 05/09/23 1614 05/09/23 1629   BP: (!) 86/64 (!) 83/59    Pulse: 82 67    Resp: 18     Temp: 98.3  F (36.8  C)     TempSrc: Oral     SpO2: 100% 95% 98%   Weight: 69.9 kg (154 lb)     Height: 1.651 m (5' 5\")         Cardiac Rhythm:  ,      Pain level:    Patient confused: No.   Patient Falls Risk: arm band in place and assistive device/personal items within reach.   Elimination Status: Has voided     Patient Report - Initial Complaint: Sr. Living facility where receives checks twice daily.  Coming in for pain at site of drain placed yesterday. Pain rating 2/10.  Recent 6 week stay at [facility] for leukemia and diverticulitis.  Has had dizziness over past 1 1/2 weeks and nausea. SBP= en route.  Patient denies light-headed. Patient states \"they've been having trouble with my blood pressure\".  Stating it has been low at [facility]. ABCD's intact; A/O x 4.  Family arrived and have greater story regarding why patient is here. Patient has been declining in health since released home from [facility] on April 26. Has not been eating or drinking very much. At risk of falling, growing weaker and weaker.  Been having various pain. Level of care she is able to get at [facility] is not high enough for patient any longer. Family states was seen at [facility] yesterday to have pigtail drain #2 placed.  Did not have patient seen in ED or clinic.   Focused Assessment: "   Gastrointestinal Gastrointestinal  LA      Gastrointestinal Gastrointestinal WDL: .WDL except; all  GI Signs/Symptoms: diarrhea; nausea  (two pigtail drains to abd, drain #2 placed yesterday)  Last Bowel Movement: 05/09/23            1700  Musculoskeletal Musculoskeletal  LA    Musculoskeletal (Adult) Musculoskeletal WDL: .WDL except; all  General Mobility: generalized weakness  (increased weakness over the past few weeks)            1700  Neuro Cognitive  Neuro Cognitive  LA    Neuro Cognitive (Adult) Cognitive/Neuro/Behavioral WDL: WDL   Moro Coma Scale Best Eye Response: 4-->(E4) spontaneous  Best Motor Response: 6-->(M6) obeys commands  Best Verbal Response: 5-->(V5) oriented  Moro Coma Scale Score: 15            Abnormal Results:   Labs Ordered and Resulted from Time of ED Arrival to Time of ED Departure   COMPREHENSIVE METABOLIC PANEL - Abnormal       Result Value    Sodium 135 (*)     Potassium 5.0      Chloride 106      Carbon Dioxide (CO2) 21 (*)     Anion Gap 8      Urea Nitrogen 13.5      Creatinine 0.77      Calcium 9.1      Glucose 128 (*)     Alkaline Phosphatase 146 (*)     AST 23      ALT 16      Protein Total 6.1 (*)     Albumin 2.8 (*)     Bilirubin Total 0.5      GFR Estimate 75     TROPONIN T, HIGH SENSITIVITY - Abnormal    Troponin T, High Sensitivity 23 (*)    LACTIC ACID WHOLE BLOOD - Normal    Lactic Acid 1.2     ROUTINE UA WITH MICROSCOPIC REFLEX TO CULTURE   CBC WITH PLATELETS AND DIFFERENTIAL        No orders to display       Treatments provided: see ED meds below  Family Comments: NA  OBS brochure/video discussed/provided to patient:  N/A  ED Medications:   Medications   0.9% sodium chloride BOLUS (1,000 mLs Intravenous $New Bag 5/9/23 2332)     Followed by   sodium chloride 0.9% infusion (has no administration in time range)   ondansetron (ZOFRAN) injection 4 mg (has no administration in time range)       Drips infusing:  No  For the majority of the shift this patient was Green.    Interventions performed were NA.    Sepsis treatment initiated: No    Cares/treatment/interventions/medications to be completed following ED care: -    ED Nurse Name: Crystal Wang RN  5:02 PM  RECEIVING UNIT ED HANDOFF REVIEW    Above ED Nurse Handoff Report was reviewed: Yes  Reviewed by: Emerita Reed RN on May 9, 2023 at 9:59 PM

## 2023-05-10 NOTE — UTILIZATION REVIEW
Admission Status; Secondary Review Determination    Under the authority of the Utilization Management Committee, the utilization review process indicated a secondary review on the above patient. The review outcome is based on review of the medical records, discussions with staff, and applying clinical experience noted on the date of the review.    (x) Inpatient Status Appropriate - This patient's medical care is consistent with medical management for inpatient care and reasonable inpatient medical practice.    RATIONALE FOR DETERMINATION: 83-year-old female with complex medical history recently diagnosed with acute diverticulitis complicated by abscess as well as a bone marrow evaluation in mid March revealing acute myeloid leukemia with monocytic differentiation.  Patient has prior history of ulcerative colitis, atrial fibrillation on anticoagulation, hypertensive chronic kidney disease as well as anxiety disorder.  Patient presently not on any treatment for the leukemia due to patient's infection and underwent a percutaneous catheter placement for her ongoing chronic intra-abdominal abscess felt to be secondary to her ulcerative colitis the day prior to admission.  Patient has an adequate nutritional intake with progressive worsening fatigue and weakness requiring acute hospitalization with IV fluids as well as antibiotic management with associated neutropenia appropriate for inpatient management.      At the time of admission with the information available to the attending physician more than 2 nights Hospital complex care was anticipated, based on patient risk of adverse outcome if treated as outpatient and complex care required. Inpatient admission is appropriate based on the Medicare guidelines.    This document was produced using voice recognition software    The information on this document is developed by the utilization review team in order for the business office to ensure compliance. This only  denotes the appropriateness of proper admission status and does not reflect the quality of care rendered.    The definitions of Inpatient Status and Observation Status used in making the determination above are those provided in the CMS Coverage Manual, Chapter 1 and Chapter 6, section 70.4.    Sincerely,    Moy Nettles MD  Utilization Review  Physician Advisor  [facility].

## 2023-05-10 NOTE — PLAN OF CARE
ROOM # 220  Living Situation (if not independent, order SW consult): [facility] but on independent living  Facility name: [facility]   : Osvaldo (son)    Activity level at baseline: Independent  Activity level on admit: A1x    Who will be transporting you at discharge: Family    Patient registered to observation; given Patient Bill of Rights; given the opportunity to ask questions about observation status and their plan of care.  Patient has been oriented to the observation room, bathroom and call light is in place.    Discussed discharge goals and expectations with patient/family.

## 2023-05-10 NOTE — PHARMACY-ADMISSION MEDICATION HISTORY
Pharmacist Admission Medication History    Admission medication history is completed to the best of ability. Patient recently moved to [facility] but was self-administering meds prior to admission, per nurse from [facility]. The information provided in this note is only as accurate as the sources available at the time of the update.    Medication reconciliation/reorder completed by provider prior to medication history? Yes    Information Source(s): Patient via in-person, Hospital Encounter Summary - [facility] 5/8/2023    Pertinent Information: -    Changes made to PTA medication list:    Added: acyclovir, levofloxacin, fluconazole    Deleted: hydrochlorothiazide     Changed: added sig to most meds on the list    Medication Affordability: Not including over the counter (OTC) medications, was there a time in the past 3 months when you did not take your medications as prescribed because of cost? Unable to Assess     Allergies reviewed with patient and updates made in EHR: no    Medication History Completed By: Mayra Sandhu RP 5/10/2023 10:48 AM    Prior to Admission medications    Medication Sig Last Dose Taking? Auth Provider Long Term End Date   acyclovir (ZOVIRAX) 400 MG tablet Take 400 mg by mouth 2 times daily 5/9/2023 at am Yes Unknown, Entered By History Yes    amoxicillin-clavulanate (AUGMENTIN) 875-125 MG tablet Take 1 tablet by mouth every 12 hours 5/9/2023 at am Yes Reported, Patient     calcium carbonate-vitamin D (CALTRATE) 600-10 MG-MCG per tablet Take 1 tablet by mouth 2 times daily 5/9/2023 Yes Reported, Patient     citalopram (CELEXA) 20 MG tablet Take 20 mg by mouth At Bedtime 5/8/2023 Yes Reported, Patient Yes    levofloxacin (LEVAQUIN) 500 MG tablet Take 500 mg by mouth daily 5/9/2023 Yes Unknown, Entered By History     loperamide (IMODIUM) 2 MG capsule Take 2 mg by mouth 4 times daily as needed Unknown Yes Reported, Patient     mesalamine (LIALDA) 1.2 g DR tablet  Take 2 tablets by mouth daily 5/9/2023 Yes Reported, Patient  2/6/24   metoprolol tartrate (LOPRESSOR) 100 MG tablet Take 100 mg by mouth 2 times daily 5/9/2023 at am Yes Reported, Patient Yes    potassium chloride ER (K-TAB) 20 MEQ CR tablet Take 20 mEq by mouth 2 times daily 5/9/2023 at am Yes Reported, Patient     vancomycin (VANCOCIN) 125 MG capsule Take 125 mg by mouth 5/9/2023 at am Yes Reported, Patient  6/7/23   XARELTO ANTICOAGULANT 20 MG TABS tablet Take 20 mg by mouth daily (with dinner) 5/8/2023 Yes Reported, Patient Yes    fluconazole (DIFLUCAN) 200 MG tablet Take 200 mg by mouth daily HOLD until neutropenic (neutrophils less than 1000) unknown  Unknown, Entered By History     ondansetron (ZOFRAN) 4 MG tablet Take 4 mg by mouth every 8 hours as needed   Reported, Patient     venetoclax (VENCLEXTA) 100 MG tablet Take 200 mg by mouth on hold  Reported, Patient  5/10/23

## 2023-05-10 NOTE — CONSULTS
[facility]    Hematology / Oncology Consultation     Date of Admission:  5/9/2023    Assessment & Plan   Noemí Meza is a 83 year old female who was admitted on 5/9/2023. I was asked to see the patient for diagnosis of acute myeloid leukemia.    Assessment:  Plan:  1.  Acute myeloid leukemia; normal karyotype NPM1 mutated  -Patient had 1 cycle of chemotherapy with decitabine and venetoclax started on 3/22/23.  -Chemotherapy was done as an inpatient in [facility], several complications as detailed below, including diverticulitis perforation and abscess formation C. difficile colitis.  -Bone marrow biopsy after 1 cycle shows morphologic remission although NPM1 remains mutated.  AML is almost certain to relapse without further treatment.   Plan  -Ongoing discussion about continued chemotherapy versus transition to comfort based care.  -Patient has become very disabled with prolonged hospitalization, unable to stay in independent living.  Multiple infectious complications.   -Patient will think about her options today will decide tomorrow morning.    2.  Diverticular abscess communicating with colon.  -Patient had another drain placed by IR at [facility] earlier this week.  Was being followed by ID at [facility] no longer plans to return there would like to transfer all care locally.   -Plan continue with antibiotics, ID consult if patient desires active treatment to guide treatment plan.     3.  Cytopenias  -Transfuse for hemoglobin less than 7 platelets less than 10    4.  Generalized weakness and failure to thrive  -Due to infectious complications acute leukemia.    5.  Atrial fibrillation.  -On anticoagulation with heparin.  -Would hold anticoagulation if platelets drop below 50,000    6.  C. difficile colitis  -Patient had a course of oral vancomycin's recent stools samples were cleared of C. Difficile.  -Due to chemotherapy and ongoing antibiotics would keep on maintenance prophylactic dose vancomycin  at 125 mg twice daily as per ID recommendation.     Kosta Barrett MD   [facility]  Office:  [phone]    Code Status    No CPR- Do NOT Intubate    Reason for Consult   Reason for consult:   Primary Care Physician   Physician No Ref-Primary    Chief Complaint   Generalized weakness      History of Present Illness   Noemí Meza is a 83 year old female who presents with generalized weakness  Patient has recent diagnosis of acute myeloid leukemia.  She had 1 course of chemotherapy done at [facility] as inpatient.  After discharge she decided to move her care to [place].  Patient has not restarted chemotherapy bone marrow biopsy after chemotherapy showed morphologic response but NPM1 remains mutated.  Patient is trying to find assisted living or nursing home placement.   Had another IR guided drain placed for diverticular abscess earlier this week.  Patient had some bleeding from her abscess drain.  Subsequently felt very weak appetite remains very poor unable to take care of herself.  Came to the ER.       Hematologic chronology    March 2023: Patient worked at [facility] [employer] [employer] and lived in [place] for most of her life.  In early 2023 she moved to [place] to live closer to her children in a senior housing living.   3/7/2023: Patient saw Dr. Castillo Garcia with 1 month history of nausea anorexia weight loss.  Initial blood test showed that patient was anemic, her peripheral smear showed circulating blast.  White blood cell count was 9.9 at the time.  Platelets were normal.  3/13/2023 patient was seen for follow-up.  On clinical examination it was noted that she had atrial fibrillation.  She was advised to get admitted through the ER.  In the ER she was in A-fib with RVR heart rate ranging from 74 -113.  Creatinine was 1.06 hemoglobin 10.1, white blood cell count 12 MCV 97.8.  Patient was started on Eliquis for anticoagulation.  Metoprolol for rate control.  3/15/2023: Bone marrow biopsy showed acute myeloid leukemia  with monocytic differentiation approximately 40% marrow blast. Normal karyotype NPM1 mutated.  Negative for flit 3 and IDH mutations.  Patient was transferred to inpatient service with plan to start treatment with decitabine and venetoclax.  Rivaroxaban was put on hold and she was started on Lovenox.    Chemotherapy started on 3/22/2023: Dose of venetoclax capped at 200.   Hospitalization was complicated with C. difficile colitis diagnosed on 4/6/2023, this was treated with oral vancomycin. 4/11/2023, patient developed left lower quadrant pain with fevers CT scan of abdomen pelvis moderate diverticulitis with small abscesses present.  Patient was treated with Zosyn.  Colorectal surgery was consulted they recommended against surgery and IR drain was placed.   Patient was seen by ID.  The purulent fluid aspirated from her abscess showed gram-positive bacilli along with hyphae and fungal smear.  Patient was on Zosyn oral vancomycin and acyclovir we recommended adding caspofungin.  4/17/2023 patient had a bone marrow after 1 cycle of treatment which showed hypercellular bone marrow with pan hyperplasia morphologically unremarkable trilineage hematopoiesis no morphologic evidence of acute leukemia.  Patient was still positive for NPM 1 mutation.   5/2/2023 patient had drain check in IR, there was persistent communication of small diverticular abscess cavity with adjacent bowel.  On CT scan there was interval increase in the size of abscess along with the posterior lateral aspect of sigmoid colon now measures 3.5 x 2.3 cm.  Percutaneous left lower quadrant abdominal drain with tip terminating just anterior to the sigmoid colon.       Past Medical History   I have reviewed this patient's medical history and updated it with pertinent information if needed.   Past Medical History:   Diagnosis Date     Chronic atrial fibrillation (H)      HTN (hypertension)      Ulcerative colitis (H)        Past Surgical History   I have  reviewed this patient's surgical history and updated it with pertinent information if needed.  No past surgical history on file.    Prior to Admission Medications   Prior to Admission Medications   Prescriptions Last Dose Informant Patient Reported? Taking?   XARELTO ANTICOAGULANT 20 MG TABS tablet 5/8/2023  Yes Yes   Sig: Take 20 mg by mouth daily (with dinner)   acyclovir (ZOVIRAX) 400 MG tablet 5/9/2023 at am  Yes Yes   Sig: Take 400 mg by mouth 2 times daily   amoxicillin-clavulanate (AUGMENTIN) 875-125 MG tablet 5/9/2023 at am  Yes Yes   Sig: Take 1 tablet by mouth every 12 hours   calcium carbonate-vitamin D (CALTRATE) 600-10 MG-MCG per tablet 5/9/2023  Yes Yes   Sig: Take 1 tablet by mouth 2 times daily   citalopram (CELEXA) 20 MG tablet 5/8/2023  Yes Yes   Sig: Take 20 mg by mouth At Bedtime   fluconazole (DIFLUCAN) 200 MG tablet unknown  Yes No   Sig: Take 200 mg by mouth daily HOLD until neutropenic (neutrophils less than 1000)   levofloxacin (LEVAQUIN) 500 MG tablet 5/9/2023  Yes Yes   Sig: Take 500 mg by mouth daily   loperamide (IMODIUM) 2 MG capsule Unknown  Yes Yes   Sig: Take 2 mg by mouth 4 times daily as needed   mesalamine (LIALDA) 1.2 g DR tablet 5/9/2023  Yes Yes   Sig: Take 2 tablets by mouth daily   metoprolol tartrate (LOPRESSOR) 100 MG tablet 5/9/2023 at am  Yes Yes   Sig: Take 100 mg by mouth 2 times daily   ondansetron (ZOFRAN) 4 MG tablet   Yes No   Sig: Take 4 mg by mouth every 8 hours as needed   potassium chloride ER (K-TAB) 20 MEQ CR tablet 5/9/2023 at am  Yes Yes   Sig: Take 20 mEq by mouth 2 times daily   vancomycin (VANCOCIN) 125 MG capsule 5/9/2023 at am  Yes Yes   Sig: Take 125 mg by mouth   venetoclax (VENCLEXTA) 100 MG tablet on hold  Yes No   Sig: Take 200 mg by mouth      Facility-Administered Medications: None     Allergies   No Known Allergies    Social History   I have reviewed this patient's social history and updated it with pertinent information if needed. Noemí Meza       Family History   I have reviewed this patient's family history and updated it with pertinent information if needed.   Family History   Problem Relation Age of Onset     No Known Problems Mother      No Known Problems Father        Review of Systems   Review of system positive for weakness, some abdominal pain poor appetite    Physical Exam   Temp: 97.5  F (36.4  C) Temp src: Oral BP: 116/60 Pulse: 82   Resp: 16 SpO2: 100 % O2 Device: None (Room air)    Vital Signs with Ranges  Temp:  [97.4  F (36.3  C)-98.9  F (37.2  C)] 97.5  F (36.4  C)  Pulse:  [] 82  Resp:  [16-18] 16  BP: ()/(51-67) 116/60  SpO2:  [95 %-100 %] 100 %  145 lbs 14.4 oz    Constitutional: Awake, alert, cooperative, no apparent distress. ECOG PS 3  Eyes: Conjunctiva and pupils examined and there is significant pallor    GI: Soft, non-distended, non-tender, normal bowel sounds.  There is left-sided percutaneous pigtail drains  Neurologic: Cranial nerves 2-12 intact, normal strength and sensation.  Psychiatric: Alert, oriented to person, place and time, no obvious anxiety or depression.    Data   Laboratory studies reviewed plan discussed with Dr. Taveras

## 2023-05-10 NOTE — PROGRESS NOTES
[facility]    Medicine Progress Note - Hospitalist Service    Date of Admission:  5/9/2023    Assessment & Plan   Noemí Meza is a 83 year old female with recent acute diverticulitis with hospitalization at [place] s/p CT guided drain placement (second) for a diverticular abscess yesterday, recent diagnosis of AML, Ulcerative Colitis, afib, HTN, CKD, anxiety  who presents from assisted living with dehydration, failure to thrive, fatigue, and generalized weakness, needing more care. Anemia, s/p 1 unit PRBC.    Generalized Weakness  Failure to thrive    - patient was admitted to [place] for 6 weeks with complicated diverticulitis    - recent diagnosis of AML    - lives independently in assisted living    - came to the hospital needing more care    - PT consult    - SW/CC as she likely will need TCU    Acute on chronic Anemia  Acute Myeloid Leukemia    - s/p 1 unit PRBC on 5/9    - CBC at recent stay at [place] noted  % blasts    - bone marrow biopsy on 3/15 revealed Acute Myeloid Leukemia with Monocytic Differentiation with 40% marrow blasts, normal karyotype, FLT3, IDH1 and IDH2 negative    - 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    - Oncology consulted    Colitis Ulcerative Chronic (HCC)    - stable    - continue home mesalamine 2.4 g daily    Recent acute diverticulitis    - has had one drain placed for abscess previously    - second drain placed 5/8    - cont oral augmentin    - orders for drain flushes     Atrial Fibrillation (HCC)  Long Term (Current) Anticoagulant Treatment    - holding home metoprolol tartrate given low BPs    - holding home Xarelto 20 mg daily at this time due to acute on chronic anemia (would consider resuming dependent on conversation/plan from Onc)    Chronic Kidney Disease   (CKD) Stage 3a     - stable    HTN    - holding home metoprolol tartrate and HCTZ given poor oral intake/low BPs    Anxiety Generalized Disorder    - continue home citalopram 10 mg  daily     Severe protein malnutrition    - poor PO intake due to lack of appetite    - likely due to underlying illnesses    - added supplements    Sisters in room and updated     Diet: Combination Diet Regular Diet Adult    DVT Prophylaxis: Heparin subcutaneous (would consider resuming xarelto as above)  Porter Catheter: Not present  Lines: None     Cardiac Monitoring: None  Code Status: No CPR- Do NOT Intubate      Clinically Significant Risk Factors Present on Admission              # Hypoalbuminemia: Lowest albumin = 2.8 g/dL at 5/9/2023  4:23 PM, will monitor as appropriate  # Drug Induced Coagulation Defect: home medication list includes an anticoagulant medication                 Disposition Plan      Expected Discharge Date: 05/11/2023                  Fredy Taveras MD  Hospitalist Service  [facility]  Securely message with [employer] (more info)  Text page via [employer] Paging/Directory   ______________________________________________________________________    Interval History   Patient is in the chair. No chest pain, sob, abdo pain. States has no appetite. No nausea. No trouble swallowing. No pain.     Physical Exam   Vital Signs: Temp: 97.5  F (36.4  C) Temp src: Oral BP: 116/60 Pulse: 82   Resp: 16 SpO2: 100 % O2 Device: None (Room air)    Weight: 145 lbs 14.4 oz    Constitutional: awake, alert, cooperative, no apparent distress, and appears stated age  Eyes: Lids and lashes normal, pupils equal, round and reactive to light, extra ocular muscles intact, sclera clear, conjunctiva normal  ENT: Normocephalic, without obvious abnormality, atraumatic, sinuses nontender on palpation, external ears without lesions, oral pharynx with moist mucous membranes, tonsils without erythema or exudates, gums normal and good dentition.  Respiratory: No increased work of breathing, good air exchange, clear to auscultation bilaterally, no crackles or wheezing  Cardiovascular: Normal apical impulse, regular  rate and rhythm, normal S1 and S2, no S3 or S4, and no murmur noted  GI: No scars, normal bowel sounds, soft, non-distended, non-tender, no masses palpated, no hepatosplenomegally  Skin: no bruising or bleeding    Medical Decision Making       30 MINUTES SPENT BY ME on the date of service doing chart review, history, exam, documentation & further activities per the note.      Data     I have personally reviewed the following data over the past 24 hrs:    1.1 (L)  \   7.3 (L)   / 199     139 111 (H) 9.0 /  94   4.0 21 (L) 0.64 \       ALT: 16 AST: 23 AP: 146 (H) TBILI: 0.5   ALB: 2.8 (L) TOT PROTEIN: 6.1 (L) LIPASE: N/A       Trop: 23 (H) BNP: N/A       Procal: N/A CRP: N/A Lactic Acid: 1.2         Imaging results reviewed over the past 24 hrs:   No results found for this or any previous visit (from the past 24 hour(s)).

## 2023-05-10 NOTE — PLAN OF CARE
Pt complaining of left elbow today states it started yesterday. Examined elbow and no signes of injury, edema or redness. Pt refuses anything for pain.

## 2023-05-10 NOTE — PROVIDER NOTIFICATION
Pt's orders need to be updated. She is asking for her bedtime meds, food and also wants drainage tube to be flushed with 5cc but there is no order for that. Thank you.

## 2023-05-10 NOTE — H&P
[facility]    History and Physical - Hospitalist Service       Date of Admission:  5/9/2023    Assessment & Plan         Noemí Meza is a 83 year old female s/p CT guided drain placement for a diverticular abscess yesterday who presents from assisted living with dehydration, failure to thrive, fatigue, and generalized weakness.    Generalized Weakness  Dehydration  Acute on chronic Anemia  Acute Myeloid Leukemia  Assessment: presents from assisted living with dehydration, failure to thrive, fatigue, and generalized weakness. Recent admission at [place] where CBC noted 7% blasts. A bone marrow biopsy on 3/15 revealed Acute Myeloid Leukemia with Monocytic Differentiation with 40% marrow blasts, normal karyotype, FLT3, IDH1 and IDH2 negative. Myeloid NGS revealed NPM1 and TET 2 mutations.  Palliative Medicine was consulted to assist with goals of care and decision making. Ultimately, Ms. Meza decided she would like to pursue chemotherapy. Follows with Dr. Kosta Barrett at [facility].  On admission 1 unit of RBC transfused in the emergency department for hemoglobin of 6.9.  Plan:  -Admit to inpatient  -Oncology consulted  -Trend hemoglobin, transfuse as needed for hemoglobin under 7  -Follow vitals/temp  -Fall precautions  -We will need eventual PT eval  -Patient is DNR/DNI    Colitis Ulcerative Chronic (HCC)  Assessment: PTA on mesalamine 2.4 g daily. She denies abdominal pain or hematochezia therefore low suspicion for ulcerative colitis flare.  She is status post percutaneous catheter placement for a diverticular abscess the day prior to admission.  PTA she is on Augmentin and oral vancomycin  Plan:  -- Continue home mesalamine 2.4 g daily  -- Continue prior to admission Augmentin  -- Continue prior to admission oral vancomycin once verified by pharmacy    Atrial Fibrillation (HCC)  Long Term (Current) Anticoagulant Treatment  Assessment: Patient denies SOB, chest pain, palpitations.  "currently.  Plan:  --hold home metoprolol tartrate given hypotension in ED  --hold home Xarelto 20 mg daily at this time due to acute on chronic anemia    Hypertensive Chronic Kidney Disease   (CKD) Stage 3a Glomerular Filtration Rate (GFR) 45 To 59 (HCC)  --Hold home metoprolol tartrate and HCTZ given poor oral intake  --Continue calcium-vitamin D supplement BID    Anxiety Generalized Disorder  --Continue home citalopram 10 mg daily       Diet:   Regular Diet  DVT Prophylaxis: DOAC  Porter Catheter: Not present  Lines: None     Cardiac Monitoring: None  Code Status:   DNR / DNI    Clinically Significant Risk Factors Present on Admission              # Hypoalbuminemia: Lowest albumin = 2.8 g/dL at 5/9/2023  4:23 PM, will monitor as appropriate  # Drug Induced Coagulation Defect: home medication list includes an anticoagulant medication         # Overweight: Estimated body mass index is 25.63 kg/m  as calculated from the following:    Height as of this encounter: 1.651 m (5' 5\").    Weight as of this encounter: 69.9 kg (154 lb).           Disposition Plan      Expected Discharge Date: 05/11/2023                  Elvis Carrion MD  Hospitalist Service  [facility]  Securely message with [email or web address] (more info)  Text page via [facility] Paging/Directory     ______________________________________________________________________    Chief Complaint     Generalized Weakness    History is obtained from the patient    History of Present Illness     Noemí Meza is a 83 year old female with extensive past medical history of acute myeloid leukemia on chemotherapy, chronic ulcerative colitis, hypertension, chronic atrial fibrillation on DOAC who presents for evaluation of generalized weakness and failure to thrive.    Patient reports that over the past few weeks, she has had continued decline in health.  She was hospitalized at [facility] extensively for several weeks due to a recent diagnosis of acute myeloid leukemia. "  She recently moved to an assisted living facility from [place].  She reports that she has had essentially no appetite, feels significantly dehydrated and overall malnourished.  She reports that she had a percutaneous catheter placed yesterday for her chronic intra-abdominal abscess secondary to her ulcerative colitis.  She recently had chemotherapy roughly 6 weeks ago, has not had any since due to diverticulitis for which she is on Augmentin for and also on prophylactic vancomycin.  She denies any bloody output from her percutaneous catheter.  Denies any vomiting, but does endorse intermittent nausea.  She denies any shortness of breath at rest, but feels significantly dyspneic with minimal exertion.  She has no chest pain.  Denies any PND/orthopnea.  She has no fevers.  She otherwise has no other complaints time.      Past Medical History    Past Medical History:   Diagnosis Date     Chronic atrial fibrillation (H)      HTN (hypertension)      Ulcerative colitis (H)        Past Surgical History   No past surgical history on file.    Prior to Admission Medications      Current Outpatient Medications on File Prior to Encounter:   acetaminophen (TYLENOL) 325 mg tablet, Take 1 tablet by mouth as needed.  calcium carbonate-vitamin D3 1,500 mg (600 mg calcium)-400 unit per tablet, Take 1 tablet by mouth 2 (two) times a day with meals.   citalopram (CeleXA) 10 mg tablet, TAKE 1 TABLET BY MOUTH DAILY  hydroCHLOROthiazide (HYDRODIURIL) 25 mg tablet, TAKE 1 TABLET BY MOUTH DAILY  mesalamine (LIALDA) 1.2 gram EC tablet, TAKE 2 TABLETS BY MOUTH DAILY  metoprolol tartrate (LOPRESSOR) 25 mg tablet, TAKE 1 TABLET BY MOUTH TWO TIMES A DAY  rivaroxaban (XARELTO) 20 mg tablet, TAKE 1 TABLET BY MOUTH DAILY WITH DINNER      Review of Systems    The 10 point Review of Systems is negative other than noted in the HPI or here.     Social History   I have reviewed this patient's social history and updated it with pertinent information  if needed.       Family History   I have reviewed this patient's family history and updated it with pertinent information if needed.  Family History   Problem Relation Age of Onset     No Known Problems Mother      No Known Problems Father      Allergies   No Known Allergies     Physical Exam   Vital Signs: Temp: 98  F (36.7  C) Temp src: Oral BP: 90/67 Pulse: 73   Resp: 18 SpO2: 97 % O2 Device: None (Room air)    Weight: 154 lbs 0 oz    Medical Decision Making         Constitutional: Chronically ill-appearing elderly female in no acute distress.  She is pale appearing.  Eyes: Lids and lashes normal, pupils equal, round and reactive to light   ENT: Normocephalic, without obvious abnormality, atraumatic, sinuses nontender on palpation   Hematologic / Lymphatic: no cervical lymphadenopathy   Respiratory: CTABL   Cardiovascular: RRR with no m/r/g   GI: Normal bowel sounds, soft, non-distended, non-tender.  Percutaneous catheter present with serosanguineous output.  Skin: normal skin color, texture, turgor   Musculoskeletal: There is no redness, warmth, or swelling of the joints. Full range of motion noted.   Neurologic: Awake, alert, oriented to name, place and time. Cranial nerves II-XII are grossly intact. Motor is 5 out of 5 bilaterally. Sensory is intact.   Neuropsychiatric: normal mood and affect      High complexity decision making      Data     I have personally reviewed the following data over the past 24 hrs:    1.3 (L)  \   6.9 (LL)   / 220     135 (L) 106 13.5 /  128 (H)   5.0 21 (L) 0.77 \       ALT: 16 AST: 23 AP: 146 (H) TBILI: 0.5   ALB: 2.8 (L) TOT PROTEIN: 6.1 (L) LIPASE: N/A       Trop: 23 (H) BNP: N/A       Procal: N/A CRP: N/A Lactic Acid: 1.2         Imaging results reviewed over the past 24 hrs:   No results found for this or any previous visit (from the past 24 hour(s)).  Most Recent 3 CBC's:  Recent Labs   Lab Test 05/09/23  1623   WBC 1.3*   HGB 6.9*   *        Most Recent 3  BMP's:  Recent Labs   Lab Test 05/09/23  1623   *   POTASSIUM 5.0   CHLORIDE 106   CO2 21*   BUN 13.5   CR 0.77   ANIONGAP 8   ALVARO 9.1   *     Most Recent 2 LFT's:  Recent Labs   Lab Test 05/09/23  1623   AST 23   ALT 16   ALKPHOS 146*   BILITOTAL 0.5     Most Recent 3 INR's:No lab results found.  Most Recent 3 Troponin's:No lab results found.  Most Recent 3 BNP's:No lab results found.  Most Recent 6 Bacteria Isolates From Any Culture (See EPIC Reports for Culture Details):No lab results found.  Most Recent Urinalysis:  Recent Labs   Lab Test 05/09/23  1716   COLOR Yellow   APPEARANCE Clear   URINEGLC Negative   URINEBILI Negative   URINEKETONE Negative   SG 1.011   UBLD Small*   URINEPH 5.0   PROTEIN Negative   NITRITE Negative   LEUKEST Negative   RBCU 1   WBCU 2

## 2023-05-10 NOTE — PLAN OF CARE
"Care from [id number]    Inpatient Progress Note:  For complete assessment see flow sheet documentation.    /63 (BP Location: Left arm)   Pulse 95   Temp 97.9  F (36.6  C) (Oral)   Resp 16   Ht 1.651 m (5' 5\")   Wt 66.2 kg (145 lb 14.4 oz)   SpO2 96%   BMI 24.28 kg/m         Orientation: Oriented x4  Neuro: alert  Pain status: Denies any pain  Activity: Ax1 GB/walker  Peripheral edema: WDL  Resp: WDL  Cardiac: known Afib  GI: soft stool/ reports diarrhea at home  :  voiding   Skin: WDL  LDA: 2 Pigtails to bulb suction  Infusions:  ml/hr  Pertinent Labs: hgb 6.9  Diet: Combination diet regular  Consults: PT    Completed blood transfusion at 0017 no reaction noted.         "

## 2023-05-11 NOTE — PROGRESS NOTES
DATE:  5/11/2023   TIME OF RECEIPT FROM LAB:  1038  LAB TEST:  Absolute Neutrophils  LAB VALUE:  0.3  RESULTS GIVEN WITH READ-BACK TO (PROVIDER):  Fredy Tavears MD  TIME LAB VALUE REPORTED TO PROVIDER:   1031

## 2023-05-11 NOTE — CONSULTS
Care Management Initial Consult    General Information  Assessment completed with: Patient, Children, Ranjith Dowd  Type of CM/SW Visit: Offer D/C Planning    Primary Care Provider verified and updated as needed: Yes   Readmission within the last 30 days:     Reason for Consult: discharge planning  Advance Care Planning: Advance Care Planning Reviewed: no concerns identified        Communication Assessment  Patient's communication style: spoken language (English or Bilingual)    Hearing Difficulty or Deaf: no   Wear Glasses or Blind: yes    Cognitive  Cognitive/Neuro/Behavioral: WDL                      Living Environment:   People in home: alone, facility resident     Current living Arrangements: independent living facility      Able to return to prior arrangements: yes     Family/Social Support:  Care provided by: self  Provides care for: no one  Marital Status:              Description of Support System: Supportive, Involved    Support Assessment: Adequate family and caregiver support, Adequate social supports    Current Resources:   Equipment currently used at home: walker, rolling    Employment/Financial:  Employment Status: retired      Financial Concerns: insurance, none   Referral to Financial Worker: No     Does the patient's insurance plan have a 3 day qualifying hospital stay waiver?  No    Lifestyle & Psychosocial Needs:  Social Determinants of Health     Tobacco Use: Not on file   Alcohol Use: Not on file   Financial Resource Strain: Not on file   Food Insecurity: Not on file   Transportation Needs: Not on file   Physical Activity: Not on file   Stress: Not on file   Social Connections: Not on file   Intimate Partner Violence: Not on file   Depression: Not on file   Housing Stability: Not on file     Functional Status:  Prior to admission patient needed assistance: Patient was admitted for diverticulitis, anemia, hypotension.     SW met with patient to complete initial assessment. She reports  that she was residing at [facility], was in the process of transitioning to [facility]. Pt reports that she was recently hospitalized for over 30 days and has declined since then.     Pt's son Yobani was at bedside for most of SW visit. Hem Onc saw patient this morning and pt/family have elected comfort care focus. Pt is requesting LTC with hospice at discharge.     SW reviewed SNF list and Medicare.gov ratings. SW discussed private pay costs for LTC, with most facilities requiring a minimum deposit of $5,000. Pt's son Yobani manages her finances and is agreeable to plan. Pt's family lives in [facility]. Pt agreeable to referrals being sent to [facility], [facility], and [facility]. They are aware that additional referrals may need to be sent.     Patient has volunteered with hospice for several years. She does not have a local hospice agency preference and is agreeable to using hospice agency that is contracted with facility once placement found.      Mental Health Status:  Mental Health Status: No Current Concerns       Chemical Dependency Status:  Chemical Dependency Status: No Current Concerns           Values/Beliefs:  Spiritual, Cultural Beliefs, Congregation Practices, Values that affect care: yes             Additional Information:  Plan: LTC with hospice, referrals sent, prefers private room. Transport TBD. PAS needed. SW will continue to follow.     TYREE Houser, [facility]   Inpatient Care Coordination  [facility]   [phone]

## 2023-05-11 NOTE — PROGRESS NOTES
[facility]    Medicine Progress Note - Hospitalist Service    Date of Admission:  5/9/2023    Assessment & Plan   Noemí Meza is a 83 year old female with recent acute diverticulitis with hospitalization at [facility] s/p CT guided drain placement (second) for a diverticular abscess yesterday, recent diagnosis of AML, Ulcerative Colitis, afib, HTN, CKD, anxiety  who presents from assisted living with dehydration, failure to thrive, fatigue, and generalized weakness, needing more care. Anemia, s/p 1 unit PRBC.    Generalized Weakness  Failure to thrive    - patient was admitted to [facility] for 6 weeks with complicated diverticulitis    - recent diagnosis of AML    - lives independently in assisted living    - came to the hospital needing more care    - PT consult    - SW/CC : plan for hospice    - Pall care consulted    Acute on chronic Anemia  Acute Myeloid Leukemia    - s/p 1 unit PRBC on 5/9    - CBC at recent stay at [facility] noted  % blasts    - bone marrow biopsy on 3/15 revealed Acute Myeloid Leukemia with Monocytic Differentiation with 40% marrow blasts, normal karyotype, FLT3, IDH1 and IDH2 negative    - 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    - I spoke with Dr. Barrett: patient choosing to pursue hospice care    Colitis Ulcerative Chronic (HCC)    - stable    - continue home mesalamine 2.4 g daily    Recent acute diverticulitis    - has had one drain placed for abscess previously    - second drain placed 5/8    - cont oral augmentin    - orders for drain flushes     Atrial Fibrillation (HCC)  Long Term (Current) Anticoagulant Treatment    - holding home metoprolol tartrate given low BPs    - holding home Xarelto 20 mg daily at this time due to acute on chronic anemia (would consider resuming dependent on conversation/plan from Onc)    Chronic Kidney Disease   (CKD) Stage 3a     - stable    HTN    - holding home metoprolol tartrate and HCTZ given poor oral intake/low BPs    Anxiety  Generalized Disorder    - continue home citalopram 10 mg daily     Severe protein malnutrition    - poor PO intake due to lack of appetite    - likely due to underlying illnesses    - added supplements    Called and updated son     Diet: Combination Diet Regular Diet Adult  Snacks/Supplements Adult: Ensure Clear; Between Meals  Snacks/Supplements Adult: Magic Cup; With Meals    DVT Prophylaxis: Heparin subcutaneous (would consider resuming xarelto as above)  Porter Catheter: Not present  Lines: None     Cardiac Monitoring: None  Code Status: No CPR- Do NOT Intubate      Clinically Significant Risk Factors              # Hypoalbuminemia: Lowest albumin = 2.8 g/dL at 5/9/2023  4:23 PM, will monitor as appropriate                    Disposition Plan      Expected Discharge Date: 05/12/2023                  Fredy Taveras MD  Hospitalist Service  [facility]  Securely message with [employer] (more info)  Text page via [employer] Paging/Directory   ______________________________________________________________________    Interval History   Patient sleeping. I will not arouse as she is comfortable. Per nurse, patient dong fine. Ambulating. Poor appetite but is eating    Physical Exam   Vital Signs: Temp: 97.9  F (36.6  C) Temp src: Oral BP: 115/73 Pulse: (!) 126   Resp: 16 SpO2: 99 % O2 Device: None (Room air)    Weight: 146 lbs 0 oz    Constitutional: sleeping  Medical Decision Making       30 MINUTES SPENT BY ME on the date of service doing chart review, history, exam, documentation & further activities per the note.      Data     I have personally reviewed the following data over the past 24 hrs:    1.2 (L)  \   7.6 (L)   / 234     139 109 (H) 6.4 (L) /  106 (H)   3.6 22 0.53 \       Procal: N/A CRP: N/A Lactic Acid: 1.3         Imaging results reviewed over the past 24 hrs:   No results found for this or any previous visit (from the past 24 hour(s)).

## 2023-05-11 NOTE — PROGRESS NOTES
[facility]    Hematology / Oncology f/u note.     Date of Admission:  5/9/2023    Assessment & Plan   Noemí Meza is a 83 year old female who was admitted on 5/9/2023. I was asked to see the patient for diagnosis of acute myeloid leukemia.    Assessment:  Plan:  1.  Acute myeloid leukemia; normal karyotype NPM1 mutated  -Patient had 1 cycle of chemotherapy with decitabine and venetoclax started on 3/22/23.  -Chemotherapy was done as an inpatient in [facility], several complications as detailed below, including diverticulitis perforation and abscess formation C. difficile colitis.  -Bone marrow biopsy after 1 cycle shows morphologic remission although NPM1 remains mutated.  AML is almost certain to relapse without further treatment.   Plan  -Ongoing discussion about continued chemotherapy versus transition to comfort based care.  -Patient has become very disabled with prolonged hospitalization, unable to stay in independent living.  Multiple infectious complications.   -5/11: Patient has decided no further chemotherapy, will pursue comfort based care.     2.  Diverticular abscess communicating with colon.  -Patient had another drain placed by IR at [facility] earlier this week.  Was being followed by ID at [facility] no longer plans to return there would like to transfer all care locally.   -Plan continue with antibiotics, ID consult if patient desires active treatment to guide treatment plan.   -We will hold off on ID consult but continue with antibiotics, Augmentin and oral vancomycin on discharge.     3.  Cytopenias  -Transfuse for hemoglobin less than 7 platelets less than 10    4.  Generalized weakness and failure to thrive  -Due to infectious complications acute leukemia.    5.  Atrial fibrillation.  -On anticoagulation with heparin.  -Would hold anticoagulation if platelets drop below 50,000    6.  C. difficile colitis  -Patient had a course of oral vancomycin's recent stools samples were cleared  of C. Difficile.  -Due to chemotherapy and ongoing antibiotics would keep on maintenance prophylactic dose vancomycin at 125 mg twice daily as per ID recommendation.     Kosta Barrett MD   [facility]  Office:  [phone]    Code Status    No CPR- Do NOT Intubate    Reason for Consult   Reason for consult:   Primary Care Physician   Physician No Ref-Primary    Chief Complaint   Generalized weakness      History of Present Illness   Noemí Meza is a 83 year old female who presents with generalized weakness  Patient has recent diagnosis of acute myeloid leukemia.  She had 1 course of chemotherapy done at [facility] as inpatient.  After discharge she decided to move her care to [facility].  Patient has not restarted chemotherapy bone marrow biopsy after chemotherapy showed morphologic response but NPM1 remains mutated.  Patient is trying to find assisted living or nursing home placement.   Had another IR guided drain placed for diverticular abscess earlier this week.  Patient had some bleeding from her abscess drain.  Subsequently felt very weak appetite remains very poor unable to take care of herself.  Came to the ER.     5/11: Overall patient slept well she has some pain in her lower abdomen at the site of drain but feels the pain is manageable.  No nausea no shortness of breath.  Continues to remain fatigued oral intake is poor.       Hematologic chronology    March 2023: Patient worked at [facility] and lived in [place] for most of her life.  In early 2023 she moved to [place] to live closer to her children in a senior housing living.   3/7/2023: Patient saw Dr. Castillo Garcia with 1 month history of nausea anorexia weight loss.  Initial blood test showed that patient was anemic, her peripheral smear showed circulating blast.  White blood cell count was 9.9 at the time.  Platelets were normal.  3/13/2023 patient was seen for follow-up.  On clinical examination it was noted that she had atrial fibrillation.   She was advised to get admitted through the ER.  In the ER she was in A-fib with RVR heart rate ranging from 74 -113.  Creatinine was 1.06 hemoglobin 10.1, white blood cell count 12 MCV 97.8.  Patient was started on Eliquis for anticoagulation.  Metoprolol for rate control.  3/15/2023: Bone marrow biopsy showed acute myeloid leukemia with monocytic differentiation approximately 40% marrow blast. Normal karyotype NPM1 mutated.  Negative for flit 3 and IDH mutations.  Patient was transferred to inpatient service with plan to start treatment with decitabine and venetoclax.  Rivaroxaban was put on hold and she was started on Lovenox.    Chemotherapy started on 3/22/2023: Dose of venetoclax capped at 200.   Hospitalization was complicated with C. difficile colitis diagnosed on 4/6/2023, this was treated with oral vancomycin. 4/11/2023, patient developed left lower quadrant pain with fevers CT scan of abdomen pelvis moderate diverticulitis with small abscesses present.  Patient was treated with Zosyn.  Colorectal surgery was consulted they recommended against surgery and IR drain was placed.   Patient was seen by ID.  The purulent fluid aspirated from her abscess showed gram-positive bacilli along with hyphae and fungal smear.  Patient was on Zosyn oral vancomycin and acyclovir we recommended adding caspofungin.  4/17/2023 patient had a bone marrow after 1 cycle of treatment which showed hypercellular bone marrow with pan hyperplasia morphologically unremarkable trilineage hematopoiesis no morphologic evidence of acute leukemia.  Patient was still positive for NPM 1 mutation.   5/2/2023 patient had drain check in IR, there was persistent communication of small diverticular abscess cavity with adjacent bowel.  On CT scan there was interval increase in the size of abscess along with the posterior lateral aspect of sigmoid colon now measures 3.5 x 2.3 cm.  Percutaneous left lower quadrant abdominal drain with tip terminating  just anterior to the sigmoid colon.       Past Medical History   I have reviewed this patient's medical history and updated it with pertinent information if needed.   Past Medical History:   Diagnosis Date     Chronic atrial fibrillation (H)      HTN (hypertension)      Ulcerative colitis (H)        Past Surgical History   I have reviewed this patient's surgical history and updated it with pertinent information if needed.  No past surgical history on file.    Prior to Admission Medications   Prior to Admission Medications   Prescriptions Last Dose Informant Patient Reported? Taking?   XARELTO ANTICOAGULANT 20 MG TABS tablet 5/8/2023  Yes Yes   Sig: Take 20 mg by mouth daily (with dinner)   acyclovir (ZOVIRAX) 400 MG tablet 5/9/2023 at am  Yes Yes   Sig: Take 400 mg by mouth 2 times daily   amoxicillin-clavulanate (AUGMENTIN) 875-125 MG tablet 5/9/2023 at am  Yes Yes   Sig: Take 1 tablet by mouth every 12 hours   calcium carbonate-vitamin D (CALTRATE) 600-10 MG-MCG per tablet 5/9/2023  Yes Yes   Sig: Take 1 tablet by mouth 2 times daily   citalopram (CELEXA) 20 MG tablet 5/8/2023  Yes Yes   Sig: Take 20 mg by mouth At Bedtime   fluconazole (DIFLUCAN) 200 MG tablet unknown  Yes No   Sig: Take 200 mg by mouth daily HOLD until neutropenic (neutrophils less than 1000)   levofloxacin (LEVAQUIN) 500 MG tablet 5/9/2023  Yes Yes   Sig: Take 500 mg by mouth daily   loperamide (IMODIUM) 2 MG capsule Unknown  Yes Yes   Sig: Take 2 mg by mouth 4 times daily as needed   mesalamine (LIALDA) 1.2 g DR tablet 5/9/2023  Yes Yes   Sig: Take 2 tablets by mouth daily   metoprolol tartrate (LOPRESSOR) 100 MG tablet 5/9/2023 at am  Yes Yes   Sig: Take 100 mg by mouth 2 times daily   ondansetron (ZOFRAN) 4 MG tablet   Yes No   Sig: Take 4 mg by mouth every 8 hours as needed   potassium chloride ER (K-TAB) 20 MEQ CR tablet 5/9/2023 at am  Yes Yes   Sig: Take 20 mEq by mouth 2 times daily   vancomycin (VANCOCIN) 125 MG capsule 5/9/2023 at am   Yes Yes   Sig: Take 125 mg by mouth   venetoclax (VENCLEXTA) 100 MG tablet on hold  Yes No   Sig: Take 200 mg by mouth      Facility-Administered Medications: None     Allergies   No Known Allergies    Social History   I have reviewed this patient's social history and updated it with pertinent information if needed. Noemí RONAN Meza      Family History   I have reviewed this patient's family history and updated it with pertinent information if needed.   Family History   Problem Relation Age of Onset     No Known Problems Mother      No Known Problems Father        Review of Systems   Review of system positive for weakness, some abdominal pain poor appetite    Physical Exam   Temp: 97.9  F (36.6  C) Temp src: Oral BP: 115/73 Pulse: (!) 126   Resp: 16 SpO2: 99 % O2 Device: None (Room air)    Vital Signs with Ranges  Temp:  [97.9  F (36.6  C)-98.1  F (36.7  C)] 97.9  F (36.6  C)  Pulse:  [] 126  Resp:  [16-18] 16  BP: (102-115)/(65-73) 115/73  SpO2:  [95 %-99 %] 99 %  146 lbs 0 oz    Constitutional: Awake, alert, cooperative, no apparent distress. ECOG PS 3  Eyes: Conjunctiva and pupils examined and there is significant pallor    GI: Soft, non-distended, non-tender, normal bowel sounds.  There is left-sided percutaneous pigtail drains  Neurologic: Cranial nerves 2-12 intact, normal strength and sensation.  Psychiatric: Alert, oriented to person, place and time, no obvious anxiety or depression.    Data   Laboratory studies reviewed plan discussed with Dr. Taveras

## 2023-05-11 NOTE — PLAN OF CARE
Physical Therapy Discharge Summary    Reason for therapy discharge:    Change in medical status.    Progress towards therapy goal(s). See goals on Care Plan in [facility] electronic health record for goal details.  Goals not met.  Barriers to achieving goals:   limited tolerance for therapy.    Therapy recommendation(s):    Continue home exercise program.

## 2023-05-11 NOTE — PLAN OF CARE
"Care from [id number]    Inpatient Progress Note:  For complete assessment see flow sheet documentation.    /68 (BP Location: Left arm)   Pulse 77   Temp 97.9  F (36.6  C) (Oral)   Resp 18   Ht 1.651 m (5' 5\")   Wt 66.2 kg (145 lb 14.4 oz)   SpO2 95%   BMI 24.28 kg/m      Orientation: Oriented x 4  Neuro: Alert  Pain status: denies any pain  Activity: A1x gait belt and walker  Peripheral edema: WDL  Resp: WDL  Cardiac: known A fib  GI: No bowel movement during the shift  :  Voiding   Skin: WDL  LDA: 2 Pigtails to bulb suction/ Pigtail #2 flushed with 5ml NS at 2000  Infusions: NA  Pertinent Labs: hgb 7.3  Diet: Combination Regular  Consults: Onco/Hematology following  Discharge Plan: TBA    Asleep in between cares.           "

## 2023-05-12 NOTE — PROGRESS NOTES
Care Management Follow Up      Expected Discharge Date: 05/16/2023     Concerns to be Addressed: Discharge planning      Patient plan of care discussed at interdisciplinary rounds: Yes    Anticipated Discharge Disposition: Long Term Care     Anticipated Discharge Services:  Hospice    Patient/family educated on Medicare website which has current facility and service quality ratings:  Yes  Education Provided on the Discharge Plan:  Yes  Patient/Family in Agreement with the Plan:  Yes    Referrals Placed by CM/SW:  Skilled Nursing Facility  Private pay costs discussed: Not applicable    Additional Information:  SW following for discharge planning to LTC with hospice. Referrals pending, no accepting facility at this time. Post acute care team assisting with following up on pending referrals. SW will continue to follow.     TYREE Houser, [facility]   Inpatient Care Coordination  [facility]   [phone]

## 2023-05-12 NOTE — PLAN OF CARE
11:00p-07:00a    Asleep in between cares. Triggered sepsis VS checked x2 Q30 mins and Lactic acid ordered came back 0.9.    Plan: LTC with hospice.

## 2023-05-12 NOTE — PLAN OF CARE
INPATIENT NOTE: 1500 - 1900     PRIMARY PROBLEM: Dehydration, Failure to Thrive, Fatigue, & Weakness      Vital Signs: Stable        Orientation: A/O x 4    Neuro: Intact    Pain status: Denies    Resp: Lung sounds, CTA denies SOB    Cardiac: WDL    GI: Bowel sounds, active Last BM, 5/11    : Continent, voiding adequately    Skin: Intact, 2 abdominal J P drains, little to no drainage. Pt got a shower this evening    LDA: Peripheral IV SL    Pertinent Labs/Imaging: Hgb 7.8    Diet: Combination Regular    Activity: Assist x 1 with walker & Gait belt    Alarms/Safety: All alarms on and audible     Consults: Hem/Onc & SW    Discharge Plan: Continue with oral Vanco &   Augmentin. Pt will enroll in Palliative Care once a facility is found    Discharge Date: TBD     Will continue to monitor and provide cares.     Flaca Olvera RN

## 2023-05-12 NOTE — PROGRESS NOTES
[facility]    Hematology / Oncology f/u note.     Date of Admission:  5/9/2023    Assessment & Plan   Noemí Meza is a 83 year old female who was admitted on 5/9/2023. I was asked to see the patient for diagnosis of acute myeloid leukemia.    Assessment:  Plan:  1.  Acute myeloid leukemia; normal karyotype NPM1 mutated  -Patient had 1 cycle of chemotherapy with decitabine and venetoclax started on 3/22/23.  -Chemotherapy was done as an inpatient in [facility], several complications as detailed below, including diverticulitis perforation and abscess formation C. difficile colitis.  -Bone marrow biopsy after 1 cycle shows morphologic remission although NPM1 remains mutated.  AML is almost certain to relapse without further treatment.   Plan  -Ongoing discussion about continued chemotherapy versus transition to comfort based care.  -Patient has become very disabled with prolonged hospitalization, unable to stay in independent living.  Multiple infectious complications.   -5/11: Patient has decided no further chemotherapy, will pursue comfort based care.     2.  Diverticular abscess communicating with colon.  -Patient had another drain placed by IR at [facility] earlier this week.  Was being followed by ID at [facility] no longer plans to return there would like to transfer all care locally.   -Plan continue with antibiotics, ID consult if patient desires active treatment to guide treatment plan.   -We will hold off on ID consult but continue with antibiotics, Augmentin and oral vancomycin on discharge.     3.  Cytopenias  -Transfuse for hemoglobin less than 7 platelets less than 10    4.  Generalized weakness and failure to thrive  -Due to infectious complications acute leukemia.    5.  Atrial fibrillation.  -On anticoagulation with heparin.  -Would hold anticoagulation if platelets drop below 50,000    6.  C. difficile colitis  -Patient had a course of oral vancomycin's recent stools samples were cleared  of C. Difficile.  -Due to chemotherapy and ongoing antibiotics would keep on maintenance prophylactic dose vancomycin at 125 mg twice daily as per ID recommendation.     Kosta Barrett MD   [facility]  Office:  [phone]    Code Status    No CPR- Do NOT Intubate    Reason for Consult   Reason for consult:   Primary Care Physician   Physician No Ref-Primary    Chief Complaint   Generalized weakness      History of Present Illness   Noemí Meza is a 83 year old female who presents with generalized weakness  Patient has recent diagnosis of acute myeloid leukemia.  She had 1 course of chemotherapy done at [facility] as inpatient.  After discharge she decided to move her care to [place].  Patient has not restarted chemotherapy bone marrow biopsy after chemotherapy showed morphologic response but NPM1 remains mutated.  Patient is trying to find assisted living or nursing home placement.   Had another IR guided drain placed for diverticular abscess earlier this week.  Patient had some bleeding from her abscess drain.  Subsequently felt very weak appetite remains very poor unable to take care of herself.  Came to the ER.     5/11: Overall patient slept well she has some pain in her lower abdomen at the site of drain but feels the pain is manageable.  No nausea no shortness of breath.  Continues to remain fatigued oral intake is poor.   5/12 patient reported that overall she is doing well she slept well.  Denies any pain today awaiting placement at nursing home.       Hematologic chronology    March 2023: Patient worked at [employer] and lived in [place] for most of her life.  In early 2023 she moved to [place] to live closer to her children in a senior housing living.   3/7/2023: Patient saw Dr. Castillo Garcia with 1 month history of nausea anorexia weight loss.  Initial blood test showed that patient was anemic, her peripheral smear showed circulating blast.  White blood cell count was 9.9 at the time.  Platelets  were normal.  3/13/2023 patient was seen for follow-up.  On clinical examination it was noted that she had atrial fibrillation.  She was advised to get admitted through the ER.  In the ER she was in A-fib with RVR heart rate ranging from 74 -113.  Creatinine was 1.06 hemoglobin 10.1, white blood cell count 12 MCV 97.8.  Patient was started on Eliquis for anticoagulation.  Metoprolol for rate control.  3/15/2023: Bone marrow biopsy showed acute myeloid leukemia with monocytic differentiation approximately 40% marrow blast. Normal karyotype NPM1 mutated.  Negative for flit 3 and IDH mutations.  Patient was transferred to inpatient service with plan to start treatment with decitabine and venetoclax.  Rivaroxaban was put on hold and she was started on Lovenox.    Chemotherapy started on 3/22/2023: Dose of venetoclax capped at 200.   Hospitalization was complicated with C. difficile colitis diagnosed on 4/6/2023, this was treated with oral vancomycin. 4/11/2023, patient developed left lower quadrant pain with fevers CT scan of abdomen pelvis moderate diverticulitis with small abscesses present.  Patient was treated with Zosyn.  Colorectal surgery was consulted they recommended against surgery and IR drain was placed.   Patient was seen by ID.  The purulent fluid aspirated from her abscess showed gram-positive bacilli along with hyphae and fungal smear.  Patient was on Zosyn oral vancomycin and acyclovir we recommended adding caspofungin.  4/17/2023 patient had a bone marrow after 1 cycle of treatment which showed hypercellular bone marrow with pan hyperplasia morphologically unremarkable trilineage hematopoiesis no morphologic evidence of acute leukemia.  Patient was still positive for NPM 1 mutation.   5/2/2023 patient had drain check in IR, there was persistent communication of small diverticular abscess cavity with adjacent bowel.  On CT scan there was interval increase in the size of abscess along with the posterior  lateral aspect of sigmoid colon now measures 3.5 x 2.3 cm.  Percutaneous left lower quadrant abdominal drain with tip terminating just anterior to the sigmoid colon.       Past Medical History   I have reviewed this patient's medical history and updated it with pertinent information if needed.   Past Medical History:   Diagnosis Date     Chronic atrial fibrillation (H)      HTN (hypertension)      Ulcerative colitis (H)        Past Surgical History   I have reviewed this patient's surgical history and updated it with pertinent information if needed.  No past surgical history on file.    Prior to Admission Medications   Prior to Admission Medications   Prescriptions Last Dose Informant Patient Reported? Taking?   XARELTO ANTICOAGULANT 20 MG TABS tablet 5/8/2023  Yes Yes   Sig: Take 20 mg by mouth daily (with dinner)   acyclovir (ZOVIRAX) 400 MG tablet 5/9/2023 at am  Yes Yes   Sig: Take 400 mg by mouth 2 times daily   amoxicillin-clavulanate (AUGMENTIN) 875-125 MG tablet 5/9/2023 at am  Yes Yes   Sig: Take 1 tablet by mouth every 12 hours   calcium carbonate-vitamin D (CALTRATE) 600-10 MG-MCG per tablet 5/9/2023  Yes Yes   Sig: Take 1 tablet by mouth 2 times daily   citalopram (CELEXA) 20 MG tablet 5/8/2023  Yes Yes   Sig: Take 20 mg by mouth At Bedtime   fluconazole (DIFLUCAN) 200 MG tablet unknown  Yes No   Sig: Take 200 mg by mouth daily HOLD until neutropenic (neutrophils less than 1000)   levofloxacin (LEVAQUIN) 500 MG tablet 5/9/2023  Yes Yes   Sig: Take 500 mg by mouth daily   loperamide (IMODIUM) 2 MG capsule Unknown  Yes Yes   Sig: Take 2 mg by mouth 4 times daily as needed   mesalamine (LIALDA) 1.2 g DR tablet 5/9/2023  Yes Yes   Sig: Take 2 tablets by mouth daily   metoprolol tartrate (LOPRESSOR) 100 MG tablet 5/9/2023 at am  Yes Yes   Sig: Take 100 mg by mouth 2 times daily   ondansetron (ZOFRAN) 4 MG tablet   Yes No   Sig: Take 4 mg by mouth every 8 hours as needed   potassium chloride ER (K-TAB) 20 MEQ  CR tablet 5/9/2023 at am  Yes Yes   Sig: Take 20 mEq by mouth 2 times daily   vancomycin (VANCOCIN) 125 MG capsule 5/9/2023 at am  Yes Yes   Sig: Take 125 mg by mouth   venetoclax (VENCLEXTA) 100 MG tablet on hold  Yes No   Sig: Take 200 mg by mouth      Facility-Administered Medications: None     Allergies   No Known Allergies    Social History   I have reviewed this patient's social history and updated it with pertinent information if needed. Noemí Meza      Family History   I have reviewed this patient's family history and updated it with pertinent information if needed.   Family History   Problem Relation Age of Onset     No Known Problems Mother      No Known Problems Father        Review of Systems   Review of system positive for weakness, some abdominal pain poor appetite    Physical Exam   Temp: 98.3  F (36.8  C) Temp src: Oral BP: 123/70 Pulse: 114   Resp: 16 SpO2: 95 % O2 Device: None (Room air)    Vital Signs with Ranges  Temp:  [98.2  F (36.8  C)-98.6  F (37  C)] 98.3  F (36.8  C)  Pulse:  [] 114  Resp:  [15-16] 16  BP: (109-123)/(63-70) 123/70  SpO2:  [95 %-97 %] 95 %  146 lbs 0 oz    Constitutional: Awake, alert, cooperative, no apparent distress. ECOG PS 3      Data   Laboratory studies reviewed

## 2023-05-12 NOTE — PLAN OF CARE
"/63 (BP Location: Left arm)   Pulse 97   Temp 98.3  F (36.8  C) (Oral)   Resp 16   Ht 1.651 m (5' 5\")   Wt 66.2 kg (146 lb)   SpO2 95%   BMI 24.30 kg/m      Neuro: A&O x4  Cardiac: WDL, denies chest pain   Lungs: WDL, on RA, denies SOB   GI: Had loose BM 5/12  : WDL, voiding spontaneously   Pain: Denies pain   IV: PIV SL, x2 CRISTO drains in abd - Irrigated, pt tolerated well  Meds: tolerates oral medication   Labs/tests: hemoglobin 7.8   Diet: regular diet with snacks and ensure   Activity: Assist x1 with walker   Misc: On isolation precaution  Plan: LTC with hospice      Currently resting in bed, able to make need known.       "

## 2023-05-12 NOTE — PROGRESS NOTES
[facility]    Medicine Progress Note - Hospitalist Service    Date of Admission:  5/9/2023    Assessment & Plan   Noemí Meza is a 83 year old female with recent acute diverticulitis with hospitalization at [facility] s/p CT guided drain placement (second) for a diverticular abscess yesterday, recent diagnosis of AML, Ulcerative Colitis, afib, HTN, CKD, anxiety  who presents from assisted living with dehydration, failure to thrive, fatigue, and generalized weakness, needing more care. Anemia, s/p 1 unit PRBC.    Generalized Weakness  Failure to thrive    - patient was admitted to [facility] for 6 weeks with complicated diverticulitis    - recent diagnosis of AML    - lives independently in assisted living    - came to the hospital needing more care    - PT consult    - SW/CC : plan for hospice    - Pall care consulted (to see today)    Acute on chronic Anemia  Acute Myeloid Leukemia  Neutropenia    - s/p 1 unit PRBC on 5/9    - CBC at recent stay at [facility] noted  % blasts    - bone marrow biopsy on 3/15 revealed Acute Myeloid Leukemia with Monocytic Differentiation with 40% marrow blasts, normal karyotype, FLT3, IDH1 and IDH2 negative    - 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    - I spoke with Dr. Barrett: patient choosing to pursue hospice care    - neutropenic precautions    Colitis Ulcerative Chronic (HCC)    - stable    - continue home mesalamine 2.4 g daily    Recent acute diverticulitis    - has had one drain placed for abscess previously    - second drain placed 5/8    - cont oral augmentin    - orders for drain flushes     Atrial Fibrillation (HCC)  Long Term (Current) Anticoagulant Treatment    - holding home metoprolol tartrate given low BPs    - holding home Xarelto 20 mg daily at this time due to acute on chronic anemia (would consider resuming dependent on conversation/plan from Onc)    Chronic Kidney Disease   (CKD) Stage 3a     - stable    HTN    - holding home metoprolol tartrate and  HCTZ given poor oral intake/low BPs    Anxiety Generalized Disorder    - continue home citalopram 10 mg daily     Severe protein malnutrition    - poor PO intake due to lack of appetite    - likely due to underlying illnesses    - added supplements    Son in room and updated     Diet: Combination Diet Regular Diet Adult  Snacks/Supplements Adult: Ensure Clear; Between Meals  Snacks/Supplements Adult: Magic Cup; With Meals    DVT Prophylaxis: Heparin subcutaneous (would consider resuming xarelto as above)  Porter Catheter: Not present  Lines: None     Cardiac Monitoring: None  Code Status: No CPR- Do NOT Intubate      Clinically Significant Risk Factors              # Hypoalbuminemia: Lowest albumin = 2.8 g/dL at 5/9/2023  4:23 PM, will monitor as appropriate                    Disposition Plan     Expected Discharge Date: 05/12/2023    Discharge Delays: *Medically Ready for Discharge  Placement - LTC              Fredy Taveras MD  Hospitalist Service  [facility]  Securely message with [facility] (more info)  Text page via [employer] Paging/Directory   ______________________________________________________________________    Interval History   Patient in chair. No pain. No chest pain, sob, abdo pain, n/v/d. Eating. Walking. No complaints    Physical Exam   Vital Signs: Temp: 98.3  F (36.8  C) Temp src: Oral BP: 118/63 Pulse: 97   Resp: 16 SpO2: 95 % O2 Device: None (Room air)    Weight: 146 lbs 0 oz    Physical Exam  Constitutional:       Appearance: Normal appearance.   HENT:      Head: Normocephalic and atraumatic.      Mouth/Throat:      Mouth: Mucous membranes are moist.   Eyes:      Conjunctiva/sclera: Conjunctivae normal.      Pupils: Pupils are equal, round, and reactive to light.   Cardiovascular:      Rate and Rhythm: Normal rate and regular rhythm.   Pulmonary:      Effort: Pulmonary effort is normal.      Breath sounds: Normal breath sounds.   Abdominal:      General: Bowel sounds are  normal.      Tenderness: There is no abdominal tenderness.      Comments: Drains noted   Neurological:      Mental Status: She is alert.       Medical Decision Making       30 MINUTES SPENT BY ME on the date of service doing chart review, history, exam, documentation & further activities per the note.      Data     I have personally reviewed the following data over the past 24 hrs:    1.3 (L)  \   7.8 (L)   / 256     N/A N/A N/A /  N/A   N/A N/A N/A \       Procal: N/A CRP: N/A Lactic Acid: 0.9         Imaging results reviewed over the past 24 hrs:   No results found for this or any previous visit (from the past 24 hour(s)).

## 2023-05-12 NOTE — PLAN OF CARE
INPATIENT NOTE: 1500 - 2300     PRIMARY PROBLEM: Dehydration, Failure to Thrive, Fatigue, & Weakness      Vital Signs: Stable        Orientation: A/O x 4    Neuro: Intact    Pain status: Denies    Resp: Lung sounds, CTA denies SOB    Cardiac: WDL    GI: Bowel sounds, active Last BM, 5/11    : Continent, voiding adequately    Skin: Intact    LDA: Peripheral IV SL    Pertinent Labs/Imaging: Hgb 7.6    Diet: Combination Regular    Activity: Assist x 1 with walker & Gait belt    Alarms/Safety: All alarms on and audible     Consults: Hem/Onc & SW    Discharge Plan: Continue with oral Vanco &   Augmentin. Pt will enroll in Palliative Care once a facility is found    Discharge Date: TBD     Will continue to monitor and provide cares.     Flaca Olvera RN

## 2023-05-12 NOTE — PROGRESS NOTES
AOx4, calls appropriately, able to make needs met  Activity: Ax1 gb walker  O2: RA  B&B: Continent. Up to bathroom. 1 Loose BM during shift  Diet: Regular. Reported having low appetite but tolerated and ate meal well.    PIV: SL  LDA: 2 Drains in abdomen to bulb suction.  Irrigated in morning.    D/c plans: Pending

## 2023-05-12 NOTE — CONSULTS
Palliative Care Consultation Note  [facility]      Patient: Noemí Meza  Date of Admission:  5/9/2023    Requesting Clinician / Team: Dr Taveras  Reason for consult: Goals of care  Decisional support  Patient and family support     Recommendations & Counseling     GOALS OF CARE:     Restorative with limits  and Hospice on discharge      ADVANCE CARE PLANNING:    Advance Directive has been requested.- Per patient Osvaldo is her HCA, no paperwork on file    There is no POLST form on file, plan to complete prior to DC.    Code status: No CPR- Do NOT Intubate    MEDICAL MANAGEMENT:   #Pain,acute     Acetaminophen (Tylenol), PRN     Consider adding PRN opioids if increased pain     #Generalized weakness  -Appreciate the input of therapies for discharge recommendations  -Appreciate the staff help with ADLs     PSYCHOSOCIAL/SPIRITUAL:    Family Son Yobani and daughter Rupinder live in [place],  Son Osvaldo lives in [place]    Palliative Care will continue to follow. Thank you for the consult and allowing us to aid in the care of Noemí Meza.    These recommendations have been discussed with patient and cindy Hilton, medical team.    Anjali Rojas NP  Securely message with [employer] (more info)  Text page via [facility] Paging/Directory       Palliative Summary/HPI     Noemí Meza is a 83 year old female with a past medical history of AML, chronic anemia, colitis ulcerative chronic, atrial fibrillation on anticoagulation, CKD3, HTN, anxiety disorder, recently had a CT guided drain placement for diverticular abscess, who presented with failure to thrive, fatigue, and generalized weakness.    Today, the patient was seen for:  Goals of care conversation    Palliative Care Summary:   Met with patient and son Yobani at the bedside and Osvaldo over the phone  I introduced our role as an extra layer of support and how we help patients and families dealing with serious, potentially life-limiting illnesses. I explained the  composition of the palliative care team.  Palliative care helps patients and families navigate their care while focusing on the whole person; providing emotional, social and spiritual support  Palliative care often assists with symptom management, information sharing about what to expect from the illness, available treatment options and what effect those options may have on the disease course, and provide effective communication and caring support. Discussed the difference between palliative care and hospice. Discussed symptom management and medications. At this time she reports that she is not having enough pain to take anything. We discussed her antibiotics as well as her drainage tubes and at this time she wants to continue with both at this time.     Prognosis, Goals, & Planning:      Functional Status just prior to this current hospitalization:    There has been a decline in daily function including increasing weakness, limited ambulation, decreased appetite.         Prognosis, Goals, and/or Advance Care Planning:  Yes    Advance Care Planning Discussion 5/12/2023. IAnjali NP met with Patient and her son today at the hospital to discuss Advance Care Planning. Noemí Meza does have decisional capacity and was present for this discussion.  Those present were informed of the voluntary nature of this discussion and wished to proceed.  The discussion included: code status, goals of care, wishes and worries, functional decline, discharge planning. This discussion began at 1400 and ended at 1445 for a total of 45 minutes.        Code Status was addressed today:     Yes-current and up to date          Patient has decision-making capacity today for complex decisions:Intact    Social:   Living situation: was previously living on her own and tried to go to assisted living however is unable to go there with her drainage tubes. She is looking at nursing home.     Medications:  I have reviewed this  patient's medication profile and medications from this hospitalization.     ROS:  Comprehensive ROS is reviewed and is negative except as here & per HPI:     Physical Exam   Vital Signs with Ranges  Temp:  [97.9  F (36.6  C)-98.6  F (37  C)] 98.3  F (36.8  C)  Pulse:  [] 97  Resp:  [15-16] 16  BP: (109-121)/(63-73) 118/63  SpO2:  [95 %-99 %] 95 %  146 lbs 0 oz    Exam:  Constitutional: alert and no distress  Head: Normocephalic. No masses, lesions, tenderness or abnormalities  Cardiovascular: RRR  Respiratory: breathing is even and nonlabored  Gastrointestinal: Abdomen soft, non-tender. BS normal. No masses, organomegaly  Musculoskeletal: extremities normal- no gross deformities noted  Skin: no suspicious lesions or rashes  Psychiatric: mentation appears normal and affect normal/bright  Pain: no s/s of pain      Data reviewed:      Results for orders placed or performed during the hospital encounter of 05/09/23 (from the past 24 hour(s))   CBC with Platelets & Differential    Narrative    The following orders were created for panel order CBC with Platelets & Differential.  Procedure                               Abnormality         Status                     ---------                               -----------         ------                     CBC with platelets and d...[[id number]]  Abnormal            Final result                 Please view results for these tests on the individual orders.   Lactic Acid STAT   Result Value Ref Range    Lactic Acid 0.9 0.7 - 2.0 mmol/L   CBC with platelets and differential   Result Value Ref Range    WBC Count 1.3 (L) 4.0 - 11.0 10e3/uL    RBC Count 2.55 (L) 3.80 - 5.20 10e6/uL    Hemoglobin 7.8 (L) 11.7 - 15.7 g/dL    Hematocrit 24.5 (L) 35.0 - 47.0 %    MCV 96 78 - 100 fL    MCH 30.6 26.5 - 33.0 pg    MCHC 31.8 31.5 - 36.5 g/dL    RDW 22.0 (H) 10.0 - 15.0 %    Platelet Count 256 150 - 450 10e3/uL    % Neutrophils 20 %    % Lymphocytes 67 %    % Monocytes 11 %    %  Eosinophils 1 %    % Basophils 0 %    % Immature Granulocytes 1 %    NRBCs per 100 WBC 0 <1 /100    Absolute Neutrophils 0.3 (LL) 1.6 - 8.3 10e3/uL    Absolute Lymphocytes 0.8 0.8 - 5.3 10e3/uL    Absolute Monocytes 0.1 0.0 - 1.3 10e3/uL    Absolute Eosinophils 0.0 0.0 - 0.7 10e3/uL    Absolute Basophils 0.0 0.0 - 0.2 10e3/uL    Absolute Immature Granulocytes 0.0 <=0.4 10e3/uL    Absolute NRBCs 0.0 10e3/uL          Medical Decision Making     MANAGEMENT DISCUSSED with the following over the past 24 hours: medical team, patient and family   Tests REVIEWED in the past 24 hours:  - See lab/imaging results included in the data section of the note  SUPPLEMENTAL HISTORY, in addition to the patient's history, over the past 24 hours obtained from:   - family  Medical complexity over the past 24 hours:  - Prescription DRUG MANAGEMENT performed  - goals of care

## 2023-05-13 NOTE — PLAN OF CARE
"/70 (BP Location: Left arm)   Pulse 91   Temp 97.9  F (36.6  C) (Oral)   Resp 18   Ht 1.651 m (5' 5\")   Wt 60.6 kg (133 lb 11.2 oz)   SpO2 96%   BMI 22.25 kg/m      [id number]  Neuro: A&O x4  Cardiac: WDL, denies chest pain   Lungs: WDL, denies sob. On RA  GI: had loose stool today  : WDL, voiding spontaneously   Pain: 3/10 pain, tylenol given- effective per pt  IV:SL. 2 CRISTO drains- dressing CDI  Meds: tolerates oral medication  Labs/tests: Hemoglobin-8, Neutrophil- 0.5 (provider aware)   Diet: regular diet with magic cup & ensure, fair appetite   Activity: Assist x1 with walker & gait belt   Misc: Seen by Hem/Onc  Plan: LTC with hospice      Currently resting in bed, able to make need known.       "

## 2023-05-13 NOTE — PROGRESS NOTES
[facility]  Internal Medicine  Progress Note    Date of Service: 5/13/2023    Patient: Noemí Meza  MRN: [id number]  Admission Date: 5/9/2023      Assessment & Plan: Noemí Meza is a 83 year old female with recent acute diverticulitis with hospitalization at [facility] s/p CT guided drain placement (second) for a diverticular abscess yesterday, recent diagnosis of AML, Ulcerative Colitis, afib, HTN, CKD, anxiety  who presents from assisted living with dehydration, failure to thrive, fatigue, and generalized weakness, needing more care. Anemia, s/p 1 unit PRBC.    Generalized Weakness  Failure to thrive    - patient was admitted to [facility] for 6 weeks with complicated diverticulitis    - recent diagnosis of AML    - lives independently in assisted living    - came to the hospital needing more care    - PT consult    - SW/CC : plan for hospice    - [facility] care consulted and saw on 5/12     Acute on chronic Anemia  Acute Myeloid Leukemia  Neutropenia    - s/p 1 unit PRBC on 5/9    - hgb stable today 8.0    - CBC at recent stay at [facility] noted blasts    - bone marrow biopsy on 3/15 revealed Acute Myeloid Leukemia with Monocytic Differentiation with 40% marrow blasts, normal karyotype, FLT3, IDH1 and IDH2 negative    - 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    - Patient had 1 cycle of chemotherapy with decitabine and venetoclax started on 3/22/23.     - Chemotherapy was done as an inpatient in [facility], including diverticulitis perforation and abscess formation C. difficile colitis.     - Bone marrow biopsy after 1 cycle shows morphologic remission although NPM1 remains mutated.  AML is almost certain to relapse without further treatment.      -  Oncology following and Patient has decided no further chemotherapy, will pursue comfort based care.     Colitis Ulcerative Chronic (HCC)    - stable    - continue home mesalamine 2.4 g daily     Recent acute diverticulitis    - has had one drain placed for abscess  "previously by IR at [facility]    - second drain placed 5/8    - cont oral augmentin    - orders for drain flushes      Atrial Fibrillation (HCC)  Long Term (Current) Anticoagulant Treatment    - holding home metoprolol tartrate given low BPs    - holding home Xarelto 20 mg daily at this time due to acute on chronic anemia.  Currently on subcutaneous heparin.      Chronic Kidney Disease   (CKD) Stage 3a     - stable     HTN    - holding home metoprolol tartrate and HCTZ given poor oral intake/low BPs     Anxiety Generalized Disorder    - continue home citalopram 10 mg daily     Severe protein malnutrition    - poor PO intake due to lack of appetite    - likely due to underlying illnesses    - added supplements    Hx C. difficile colitis    - due to chemotherapy and ongoing antibiotics pt is on maintenance prophylactic dose vancomycin at 125 mg twice daily as per ID recommendation       CODE: dnr/dni  DVT: subcutaneous heparin  Diet/fluids: regular  Disposition:  Awaiting placement      Heather DELGADOC  Hospitalist Physician Assistant  [facility]      Subjective & Interval Hx:    Patient reports mild abdominal pain after her tubes were flushed.  Tolerating a diet.  Denies chest pain, shortness of breath.    Last 24 hr care team notes reviewed.   ROS:  4 point ROS including Respiratory, CV, GI and , other than that noted in the HPI, is negative.    Physical Exam:    Blood pressure 119/65, pulse 101, temperature 97  F (36.1  C), temperature source Oral, resp. rate 16, height 1.651 m (5' 5\"), weight 60.6 kg (133 lb 11.2 oz), SpO2 97 %.  General: Alert, interactive, NAD, sitting up in a chair, pleasant and cooperative.  HEENT: AT/NC  Resp: clear to auscultation bilaterally, no crackles or wheezes  Cardiac: regular rate and rhythm, no murmur  Abdomen: Soft, nontender, nondistended. +BS.  No rebound or guarding.  Two guille drains in place.  Extremities: No LE edema  Skin: Warm and dry  Neuro: Alert & oriented " x 3, moves all extremities equally    Labs & Images:  Reviewed in Epic   Medications:    Current Facility-Administered Medications   Medication     acetaminophen (TYLENOL) tablet 975 mg     amoxicillin-clavulanate (AUGMENTIN) 875-125 MG per tablet 1 tablet     citalopram (celeXA) tablet 20 mg     heparin ANTICOAGULANT injection 5,000 Units     lidocaine (LMX4) cream     lidocaine 1 % 0.1-1 mL     melatonin tablet 1 mg     mesalamine (LIALDA) DR tablet 2.4 g     sodium chloride (PF) 0.9% PF flush 3 mL     sodium chloride (PF) 0.9% PF flush 3 mL     vancomycin (VANCOCIN) capsule 125 mg

## 2023-05-13 NOTE — PROGRESS NOTES
[facility]    Hematology / Oncology f/u note.     Date of Admission:  5/9/2023    Assessment & Plan   Noemí Meza is a 83 year old female who was admitted on 5/9/2023. I was asked to see the patient for diagnosis of acute myeloid leukemia.    Assessment:  Plan:  1.  Acute myeloid leukemia; normal karyotype NPM1 mutated  -Patient had 1 cycle of chemotherapy with decitabine and venetoclax started on 3/22/23.  -Chemotherapy was done as an inpatient in [facility], several complications as detailed below, including diverticulitis perforation and abscess formation C. difficile colitis.  -Bone marrow biopsy after 1 cycle shows morphologic remission although NPM1 remains mutated.  AML is almost certain to relapse without further treatment.   Plan  -Ongoing discussion about continued chemotherapy versus transition to comfort based care.  -Patient has become very disabled with prolonged hospitalization, unable to stay in independent living.  Multiple infectious complications.   -5/11: Patient has decided no further chemotherapy, will pursue comfort based care.   -Would recommend minimizing vitals lab draws, especially nights ago patient can get good sleep.     2.  Diverticular abscess communicating with colon.  -Patient had another drain placed by IR at [facility] earlier this week.  Was being followed by ID at [facility] no longer plans to return there would like to transfer all care locally.   -Plan continue with antibiotics, ID consult if patient desires active treatment to guide treatment plan.   -We will hold off on ID consult but continue with antibiotics, Augmentin and oral vancomycin on discharge.     3.  Cytopenias  -Transfuse for hemoglobin less than 7 platelets less than 10    4.  Generalized weakness and failure to thrive  -Due to infectious complications acute leukemia.    5.  Atrial fibrillation.  -On anticoagulation with heparin.  -Would hold anticoagulation if platelets drop below 50,000    6.  C.  difficile colitis  -Patient had a course of oral vancomycin's recent stools samples were cleared of C. Difficile.  -Due to chemotherapy and ongoing antibiotics would keep on maintenance prophylactic dose vancomycin at 125 mg twice daily as per ID recommendation.     Kosta Barrett MD   [facility]  Office:  [phone]    Code Status    No CPR- Do NOT Intubate    Reason for Consult   Reason for consult:   Primary Care Physician   Physician No Ref-Primary    Chief Complaint   Generalized weakness      History of Present Illness   Noemí Meza is a 83 year old female who presents with generalized weakness  Patient has recent diagnosis of acute myeloid leukemia.  She had 1 course of chemotherapy done at [facility] as inpatient.  After discharge she decided to move her care to [facility].  Patient has not restarted chemotherapy bone marrow biopsy after chemotherapy showed morphologic response but NPM1 remains mutated.  Patient is trying to find assisted living or nursing home placement.   Had another IR guided drain placed for diverticular abscess earlier this week.  Patient had some bleeding from her abscess drain.  Subsequently felt very weak appetite remains very poor unable to take care of herself.  Came to the ER.     5/11: Overall patient slept well she has some pain in her lower abdomen at the site of drain but feels the pain is manageable.  No nausea no shortness of breath.  Continues to remain fatigued oral intake is poor.   5/12 patient reported that overall she is doing well she slept well.  Denies any pain today awaiting placement at nursing home.   5/13: Overall stable, pain is minimal able to eat but appetite remains poor.  No fever.  Patient been sleeping quite a bit but reported that Interrupted in the night for vitals lab draws etc.      Hematologic chronology    March 2023: Patient worked at [facility] and lived in [place] for most of her life.  In early 2023 she moved to [place] to live closer to  her children in a senior housing living.   3/7/2023: Patient saw Dr. Castillo Garcia with 1 month history of nausea anorexia weight loss.  Initial blood test showed that patient was anemic, her peripheral smear showed circulating blast.  White blood cell count was 9.9 at the time.  Platelets were normal.  3/13/2023 patient was seen for follow-up.  On clinical examination it was noted that she had atrial fibrillation.  She was advised to get admitted through the ER.  In the ER she was in A-fib with RVR heart rate ranging from 74 -113.  Creatinine was 1.06 hemoglobin 10.1, white blood cell count 12 MCV 97.8.  Patient was started on Eliquis for anticoagulation.  Metoprolol for rate control.  3/15/2023: Bone marrow biopsy showed acute myeloid leukemia with monocytic differentiation approximately 40% marrow blast. Normal karyotype NPM1 mutated.  Negative for flit 3 and IDH mutations.  Patient was transferred to inpatient service with plan to start treatment with decitabine and venetoclax.  Rivaroxaban was put on hold and she was started on Lovenox.    Chemotherapy started on 3/22/2023: Dose of venetoclax capped at 200.   Hospitalization was complicated with C. difficile colitis diagnosed on 4/6/2023, this was treated with oral vancomycin. 4/11/2023, patient developed left lower quadrant pain with fevers CT scan of abdomen pelvis moderate diverticulitis with small abscesses present.  Patient was treated with Zosyn.  Colorectal surgery was consulted they recommended against surgery and IR drain was placed.   Patient was seen by ID.  The purulent fluid aspirated from her abscess showed gram-positive bacilli along with hyphae and fungal smear.  Patient was on Zosyn oral vancomycin and acyclovir we recommended adding caspofungin.  4/17/2023 patient had a bone marrow after 1 cycle of treatment which showed hypercellular bone marrow with pan hyperplasia morphologically unremarkable trilineage hematopoiesis no morphologic  evidence of acute leukemia.  Patient was still positive for NPM 1 mutation.   5/2/2023 patient had drain check in IR, there was persistent communication of small diverticular abscess cavity with adjacent bowel.  On CT scan there was interval increase in the size of abscess along with the posterior lateral aspect of sigmoid colon now measures 3.5 x 2.3 cm.  Percutaneous left lower quadrant abdominal drain with tip terminating just anterior to the sigmoid colon.       Past Medical History   I have reviewed this patient's medical history and updated it with pertinent information if needed.   Past Medical History:   Diagnosis Date     Chronic atrial fibrillation (H)      HTN (hypertension)      Ulcerative colitis (H)        Past Surgical History   I have reviewed this patient's surgical history and updated it with pertinent information if needed.  No past surgical history on file.    Prior to Admission Medications   Prior to Admission Medications   Prescriptions Last Dose Informant Patient Reported? Taking?   XARELTO ANTICOAGULANT 20 MG TABS tablet 5/8/2023  Yes Yes   Sig: Take 20 mg by mouth daily (with dinner)   acyclovir (ZOVIRAX) 400 MG tablet 5/9/2023 at am  Yes Yes   Sig: Take 400 mg by mouth 2 times daily   amoxicillin-clavulanate (AUGMENTIN) 875-125 MG tablet 5/9/2023 at am  Yes Yes   Sig: Take 1 tablet by mouth every 12 hours   calcium carbonate-vitamin D (CALTRATE) 600-10 MG-MCG per tablet 5/9/2023  Yes Yes   Sig: Take 1 tablet by mouth 2 times daily   citalopram (CELEXA) 20 MG tablet 5/8/2023  Yes Yes   Sig: Take 20 mg by mouth At Bedtime   fluconazole (DIFLUCAN) 200 MG tablet unknown  Yes No   Sig: Take 200 mg by mouth daily HOLD until neutropenic (neutrophils less than 1000)   levofloxacin (LEVAQUIN) 500 MG tablet 5/9/2023  Yes Yes   Sig: Take 500 mg by mouth daily   loperamide (IMODIUM) 2 MG capsule Unknown  Yes Yes   Sig: Take 2 mg by mouth 4 times daily as needed   mesalamine (LIALDA) 1.2 g DR tablet  5/9/2023  Yes Yes   Sig: Take 2 tablets by mouth daily   metoprolol tartrate (LOPRESSOR) 100 MG tablet 5/9/2023 at am  Yes Yes   Sig: Take 100 mg by mouth 2 times daily   ondansetron (ZOFRAN) 4 MG tablet   Yes No   Sig: Take 4 mg by mouth every 8 hours as needed   potassium chloride ER (K-TAB) 20 MEQ CR tablet 5/9/2023 at am  Yes Yes   Sig: Take 20 mEq by mouth 2 times daily   vancomycin (VANCOCIN) 125 MG capsule 5/9/2023 at am  Yes Yes   Sig: Take 125 mg by mouth   venetoclax (VENCLEXTA) 100 MG tablet on hold  Yes No   Sig: Take 200 mg by mouth      Facility-Administered Medications: None     Allergies   No Known Allergies    Social History   I have reviewed this patient's social history and updated it with pertinent information if needed. Noemí Meza      Family History   I have reviewed this patient's family history and updated it with pertinent information if needed.   Family History   Problem Relation Age of Onset     No Known Problems Mother      No Known Problems Father        Review of Systems   Review of system positive for weakness, some abdominal pain poor appetite    Physical Exam   Temp: 97  F (36.1  C) Temp src: Oral BP: 119/65 Pulse: 101   Resp: 16 SpO2: 97 % O2 Device: None (Room air)    Vital Signs with Ranges  Temp:  [97  F (36.1  C)-99.6  F (37.6  C)] 97  F (36.1  C)  Pulse:  [101-117] 101  Resp:  [16-18] 16  BP: (105-123)/(57-70) 119/65  SpO2:  [92 %-97 %] 97 %  133 lbs 11.2 oz    Constitutional: Awake, alert, cooperative, no apparent distress. ECOG PS 3      Data   Laboratory studies reviewed

## 2023-05-13 NOTE — PLAN OF CARE
Vitals are Temp: 98.9  F (37.2  C) Temp src: Oral BP: 115/65 Pulse: 113   Resp: 18 SpO2: 95 %.  Patient is Alert and Oriented x4. They are 1 Assist with Walker.  Patient is on Neutropenic precuations.  Pt is a Regular diet.  They are complaining of 1/10 pain in their abdomen.  Declines need for intervention.  Patient is Saline locked. CRISTO drains to L sided abdomen x2 with brown drainage. Both drains flushed w/ 10ml NS per orders. Trace edema to BLE. Vancomycin PO BID for C-Diff prophylaxis. Augmentin PO q12hrs for abscess. Patient triggered for sepsis, lactic acid 0.9. PT, oncology, and palliative care following. SW following for discharge. Plan is to discharge to LTC with hospice.

## 2023-05-13 NOTE — PLAN OF CARE
Temp: 97.9  F (36.6  C) Temp src: Oral BP: 122/70 Pulse: 91   Resp: 18 SpO2: 96 % O2 Device: None (Room air)      A&Ox4. Up Ax1 with walker and gait belt. Daughter here visiting and pt got into wheelchair and her daughter pushed her around the hospital. Complained of left abdominal pain, prn tylenol given x1. x2 CRISTO drains intact and draining, output a dark brown/red.    Plan- SW following for LTC placement on hospice, daily weight, pain control, flush CRISTO drains 2x daily.

## 2023-05-14 NOTE — PROGRESS NOTES
Heme-onc chart check note.  Recent diagnosis of acute myeloid leukemia in morphologic remission after 1 cycle of chemotherapy patient decided against further chemotherapy at this point due to declining performance status awaiting, discharged with hospice placement

## 2023-05-14 NOTE — PLAN OF CARE
"[id number]    Inpatient Progress Note:    /70 (BP Location: Left arm)   Pulse 91   Temp 97.9  F (36.6  C) (Oral)   Resp 18   Ht 1.651 m (5' 5\")   Wt 60.6 kg (133 lb 11.2 oz)   SpO2 96%   BMI 22.25 kg/m       Pt Aox4. Tylenol given for 1/10 left abdominal pain, alleviated pain. 2 CRISTO drains to left abdomen. Small amount from drain #2, no drainage from drain #1. Assist of 1 with walker and gait belt. PIV SL. Regular diet. Will continue to monitor and provide supportive cares.       "

## 2023-05-14 NOTE — PROGRESS NOTES
[facility]  Internal Medicine  Progress Note    Date of Service: 5/14/2023    Patient: Noemí Meza  MRN: [id number]  Admission Date: 5/9/2023      Assessment & Plan: Noemí Meza is a 83 year old female with recent acute diverticulitis with hospitalization at [facility] s/p CT guided drain placement (second) for a diverticular abscess yesterday, recent diagnosis of AML, Ulcerative Colitis, afib, HTN, CKD, anxiety  who presents from assisted living with dehydration, failure to thrive, fatigue, and generalized weakness, needing more care. Anemia, s/p 1 unit PRBC.    Generalized Weakness  Failure to thrive    - patient was admitted to [facility] for 6 weeks with complicated diverticulitis    - recent diagnosis of AML    - lives independently in assisted living    - came to the hospital needing more care    - PT consult    - SW/CC : plan for hospice    - Pall care consulted and saw on 5/12     Acute on chronic Anemia  Acute Myeloid Leukemia  Neutropenia    - s/p 1 unit PRBC on 5/9    - hgb stable 5/13 8.0    - no further lab orders per discussion with Oncology as pt is now comfort based approach    - CBC at recent stay at [facility] noted blasts    - bone marrow biopsy on 3/15 revealed Acute Myeloid Leukemia with Monocytic Differentiation with 40% marrow blasts, normal karyotype, FLT3, IDH1 and IDH2 negative    - 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    - Patient had 1 cycle of chemotherapy with decitabine and venetoclax started on 3/22/23.     - Chemotherapy was done as an inpatient in [facility], including diverticulitis perforation and abscess formation C. difficile colitis.     - Bone marrow biopsy after 1 cycle shows morphologic remission although NPM1 remains mutated.  AML is almost certain to relapse without further treatment.      -  Oncology following and Patient has decided no further chemotherapy, will pursue comfort based care.     Colitis Ulcerative Chronic (HCC)    - stable    - continue home  "mesalamine 2.4 g daily     Recent acute diverticulitis    - has had one drain placed for abscess previously by IR at [facility]    - second drain placed 5/8    - cont oral augmentin    - orders for drain flushes      Atrial Fibrillation (HCC)  Long Term (Current) Anticoagulant Treatment    - holding home metoprolol tartrate given low BPs    - holding home Xarelto 20 mg daily at this time due to acute on chronic anemia.  Currently on subcutaneous heparin.      Chronic Kidney Disease   (CKD) Stage 3a     - stable     HTN    - holding home metoprolol tartrate and HCTZ given poor oral intake/low BPs     Anxiety Generalized Disorder    - continue home citalopram 10 mg daily     Severe protein malnutrition    - poor PO intake due to lack of appetite    - likely due to underlying illnesses    - added supplements     Hx C. difficile colitis    - due to chemotherapy and ongoing antibiotics pt is on maintenance prophylactic dose vancomycin at 125 mg twice daily as per ID recommendation        CODE: dnr/dni  DVT: subcutaneous heparin  Diet/fluids: regular  Disposition:  Awaiting placement      Heather Sloan MS, PA-C  Hospitalist Physician Assistant  [facility]      Subjective & Interval Hx:    Patient denies any significant pain.  Denies shortness of breath.  Has eaten and visiting with family.     Last 24 hr care team notes reviewed.   ROS:  4 point ROS including Respiratory, CV, GI and , other than that noted in the HPI, is negative.    Physical Exam:    Blood pressure 90/64, pulse (!) 122, temperature 98.8  F (37.1  C), temperature source Oral, resp. rate 16, height 1.651 m (5' 5\"), weight 66.6 kg (146 lb 14.4 oz), SpO2 93 %.  General: Alert, interactive, NAD, sitting up in a chair, pleasant and cooperative.  Family at the bedside  HEENT: AT/NC  Resp: clear to auscultation bilaterally, no crackles or wheezes  Cardiac: regular rate and rhythm, no murmur  Abdomen: Soft, nontender, nondistended. +BS.  No rebound or " guarding.  Two guille drains in place with scant output.  Extremities: No LE edema  Skin: Warm and dry  Neuro: Alert & oriented x 3, moves all extremities equally    Labs & Images:  Reviewed in Epic   Medications:    Current Facility-Administered Medications   Medication     acetaminophen (TYLENOL) tablet 975 mg     amoxicillin-clavulanate (AUGMENTIN) 875-125 MG per tablet 1 tablet     citalopram (celeXA) tablet 20 mg     heparin ANTICOAGULANT injection 5,000 Units     lidocaine (LMX4) cream     lidocaine 1 % 0.1-1 mL     melatonin tablet 1 mg     mesalamine (LIALDA) DR tablet 2.4 g     sodium chloride (PF) 0.9% PF flush 3 mL     sodium chloride (PF) 0.9% PF flush 3 mL     vancomycin (VANCOCIN) capsule 125 mg

## 2023-05-14 NOTE — PLAN OF CARE
Vitals are Temp: 98.5  F (36.9  C) Temp src: Oral BP: 96/65 Pulse: 98   Resp: 16 SpO2: 98 %.  Patient is Alert and Oriented x4. They are 1 Assist with Gait Belt and Walker.  Patient is on Neutropenic precuations.  Pt is a Regular diet.  They are denying pain.  Patient is Saline locked. CRISTO drains to L sided abdomen x2 with brown/dark red drainage. Both drains flushed w/ 10ml NS per orders. Trace edema noted to BLE. Vancomycin PO BID for C-Diff prophylaxis. Augmentin PO q12hrs for abscess. Patient triggered for sepsis, lactic acid 1.2. PT, oncology, and palliative care following. SW following for discharge. Plan is to discharge to LTC with hospice.

## 2023-05-14 NOTE — PLAN OF CARE
"BP 90/64 (BP Location: Right arm)   Pulse (!) 122   Temp 98.8  F (37.1  C) (Oral)   Resp 16   Ht 1.651 m (5' 5\")   Wt 66.6 kg (146 lb 14.4 oz)   SpO2 93%   BMI 24.45 kg/m      Neuro: A&O x4  Cardiac: denies chest pain, HR irregular   Lungs: WDL, om RA. Denies sob   GI: Has had intermittent abd pain  : WDL, voiding spontaneously   Pain: 4/10 pain in LLQ, Tylenol given.   IV: SL  Meds: Tolerates oral medication well. On PO Augmentin & Vancomycin   Labs/tests: Per provider- no more lactic acid labs for sepsis trigger.   Diet: regular   Activity: Assist x1 with walker  Misc: On neutropenic precautions   Plan: LTC with hospice      Currently resting in bed, able to make need known.       "

## 2023-05-15 NOTE — PROGRESS NOTES
[facility]  Internal Medicine  Progress Note    Date of Service: 5/15/2023    Patient: Noemí Meza  MRN: [id number]  Admission Date: 5/9/2023      Assessment & Plan: Noemí Meza is a 83 year old female with recent acute diverticulitis with hospitalization at [place] s/p CT guided drain placement (second) for a diverticular abscess yesterday, recent diagnosis of AML, Ulcerative Colitis, afib, HTN, CKD, anxiety  who presents from assisted living with dehydration, failure to thrive, fatigue, and generalized weakness, needing more care. Anemia, s/p 1 unit PRBC.    Generalized Weakness  Failure to thrive    - patient was admitted to [place] for 6 weeks with complicated diverticulitis    - recent diagnosis of AML    - lives independently in assisted living    - came to the hospital needing more care    - PT consult    - SW/CC : plan for hospice    - Pall care consulted and saw on 5/12, planning for comfort care and hospice on discharge     Acute on chronic Anemia  Acute Myeloid Leukemia  Neutropenia    - s/p 1 unit PRBC on 5/9    - hgb stable 5/13 8.0    - no further lab orders per discussion with Oncology as pt is now comfort based approach    - CBC at recent stay at [place] noted blasts    - bone marrow biopsy on 3/15 revealed Acute Myeloid Leukemia with Monocytic Differentiation with 40% marrow blasts, normal karyotype, FLT3, IDH1 and IDH2 negative    - 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    - Patient had 1 cycle of chemotherapy with decitabine and venetoclax started on 3/22/23.     - Chemotherapy was done as an inpatient in [facility], including diverticulitis perforation and abscess formation C. difficile colitis.     - Bone marrow biopsy after 1 cycle shows morphologic remission although NPM1 remains mutated.  AML is almost certain to relapse without further treatment.      -  Oncology following and Patient has decided no further chemotherapy, will pursue comfort based care.     Colitis Ulcerative  "Chronic (HCC)    - stable    - continue home mesalamine 2.4 g daily     Recent acute diverticulitis    - has had one drain placed for abscess previously by IR at [place]    - second drain placed 5/8    - plan to discharge with drains in place    - cont oral augmentin    - orders for drain flushes      Atrial Fibrillation (HCC)  Long Term (Current) Anticoagulant Treatment    - holding home metoprolol tartrate given low BPs    - holding home Xarelto 20 mg daily at this time due to acute on chronic anemia.  Currently on subcutaneous heparin.      Chronic Kidney Disease   (CKD) Stage 3a     - stable     HTN    - holding home metoprolol tartrate and HCTZ given poor oral intake/low BPs     Anxiety Generalized Disorder    - continue home citalopram 10 mg daily     Severe protein malnutrition    - poor PO intake due to lack of appetite    - likely due to underlying illnesses    - added supplements     Hx C. difficile colitis    - due to chemotherapy and ongoing antibiotics pt is on maintenance prophylactic dose vancomycin at 125 mg twice daily as per ID recommendation        CODE: dnr/dni  DVT: subcutaneous heparin  Diet/fluids: regular  Disposition:  Awaiting placement        Heather Sloan MS PA-C  Hospitalist Physician Assistant  [facility]       Subjective & Interval Hx:    Patient sitting up in bed talking with family.  No new concerns.    Last 24 hr care team notes reviewed.   ROS:  4 point ROS including Respiratory, CV, GI and , other than that noted in the HPI, is negative.    Physical Exam:    Blood pressure 107/69, pulse 109, temperature 98.5  F (36.9  C), temperature source Oral, resp. rate 16, height 1.651 m (5' 5\"), weight 66.2 kg (145 lb 14.4 oz), SpO2 97 %.  General: Alert, interactive, NAD, sitting up in a chair, pleasant and cooperative.  Family at the bedside  HEENT: AT/NC  Resp: clear to auscultation bilaterally, no crackles or wheezes  Cardiac: regular rate and rhythm, no murmur  Abdomen: " Soft, nontender, nondistended. +BS.  No rebound or guarding.  Two guille drains in place with scant output.  Extremities: No LE edema  Skin: Warm and dry  Neuro: Alert & oriented x 3, moves all extremities equally    Labs & Images:  Reviewed in Epic   Medications:    Current Facility-Administered Medications   Medication     acetaminophen (TYLENOL) Suppository 650 mg     acetaminophen (TYLENOL) tablet 975 mg     amoxicillin-clavulanate (AUGMENTIN) 875-125 MG per tablet 1 tablet     atropine 1 % ophthalmic solution 2 drop     [START ON 5/18/2023] bisacodyl (DULCOLAX) suppository 10 mg     citalopram (celeXA) tablet 20 mg     haloperidol (HALDOL) 2 MG/ML (HIGH CONC) solution 1 mg     heparin ANTICOAGULANT injection 5,000 Units     lidocaine (LMX4) cream     lidocaine 1 % 0.1-1 mL     LORazepam (ATIVAN) injection 1 mg    Or     LORazepam (ATIVAN) tablet 1 mg     melatonin tablet 1 mg     mesalamine (LIALDA) DR tablet 2.4 g     morphine sulfate (ROXANOL) 20 mg/mL (HIGH CONC) soln 2.5-5 mg     naloxone (NARCAN) injection 0.1 mg     naloxone (NARCAN) injection 0.2 mg     ondansetron (ZOFRAN ODT) ODT tab 4 mg    Or     ondansetron (ZOFRAN) injection 4 mg     sodium chloride (PF) 0.9% PF flush 3 mL     sodium chloride (PF) 0.9% PF flush 3 mL     vancomycin (VANCOCIN) capsule 125 mg

## 2023-05-15 NOTE — PROGRESS NOTES
PALLIATIVE CARE PROGRESS NOTE  [facility]     Patient Name: Noemí Meza  Date of Admission: 5/9/2023   Today the patient was seen for: comfort cares and hospice     Recommendations & Counseling     GOALS OF CARE:     Restorative with limits  and Hospice on discharge       ADVANCE CARE PLANNING:    Advance Directive has been requested.- Per patient Osvaldo is her HCA, no paperwork on file    There is no POLST form on file, plan to complete prior to DC.    Code status: No CPR- Do NOT Intubate     MEDICAL MANAGEMENT:   #Pain,acute     Acetaminophen (Tylenol), PRN     Consider adding PRN opioids if increased pain     #Generalized weakness  -Appreciate the input of therapies for discharge recommendations  -Appreciate the staff help with ADLs     #Comfort Care   #Air hunger/Dyspnea   - 1st choice:Morphine PO/SL 2.5-5 mg q 2 hours as needed.   #Anxiety    - 1st choice: Lorazepam PO/SL q 1 mg  q 3 hour as needed.   - 2nd choice: Lorazepam IV q 1 mg  q 3 hour as needed  #Secretion burden   -Atropine drops 2 drops every 4 hours PRN  #Nausea   -Zofran 4 mg q 6 hours as needed. Can increase to 8 mg   #Agitation  -Aggressive symptoms control first, then  -1st choice: Haloperidol 1 mg every 4 hours prn    PSYCHOSOCIAL/SPIRITUAL:    Family Son Yobani and daughter Rupinder live in [place],  Son Osvaldo lives in [place]    Palliative Care will continue to follow. Thank you for allowing us to aid in the care of Noemí Meza.    These recommendations have been discussed with medical team and patient.    Anjali Rojas NP  Securely message with Vocera (more info)  Text page via emere Paging/Directory               Interval History:     Prognosis, Goals, or Advance Care Planning was addressed today:  Goals remain comfort cares and hospice on discharge    Chart review/discussion with unit or clinical team members:   No changes, discussed medications and comfort care plan. Discussed discussed medications and changes.  Discussed discharge plans with patient and family and medical/SW team          Review of Systems:     Comprehensive ROS is reviewed and is negative except as here & per HPI: none          Physical Exam:   Temp: 98.5  F (36.9  C) Temp src: Oral Temp  Min: 97.9  F (36.6  C)  Max: 98.5  F (36.9  C) BP: 107/69 Pulse: 109   Resp: 16 SpO2: 97 % O2 Device: None (Room air)    Vital Signs with Ranges  Temp:  [97.9  F (36.6  C)-98.5  F (36.9  C)] 98.5  F (36.9  C)  Pulse:  [] 109  Resp:  [16] 16  BP: (105-107)/(69-71) 107/69  SpO2:  [95 %-97 %] 97 %  146 lbs 14.4 oz    Physical Exam  Constitutional:       Appearance: Normal appearance.   HENT:      Nose: Nose normal.   Cardiovascular:      Rate and Rhythm: Rhythm irregular.   Pulmonary:      Effort: Pulmonary effort is normal.      Breath sounds: Normal breath sounds.   Abdominal:      General: Bowel sounds are normal.   Neurological:      Mental Status: She is alert.   Psychiatric:         Mood and Affect: Mood normal.         Behavior: Behavior normal.                  Current Problem List:   Principal Problem:    Diverticulitis of colon  Active Problems:    Dehydration    Hypotension, unspecified hypotension type    Anemia, unspecified type      No Known Allergies         Data Reviewed:       No results found for this or any previous visit (from the past 24 hour(s)).       Medical Decision Making     MANAGEMENT DISCUSSED with the following over the past 24 hours: medical team, patient   Tests REVIEWED in the past 24 hours:  - See lab/imaging results included in the data section of the note  SUPPLEMENTAL HISTORY, in addition to the patient's history, over the past 24 hours obtained from:   - son  Medical complexity over the past 24 hours:  - Prescription DRUG MANAGEMENT performed  - discharge planning and medication management

## 2023-05-15 NOTE — PROGRESS NOTES
Heme-onc chart check note.  Recent diagnosis of acute myeloid leukemia in morphologic remission after 1 cycle of chemotherapy patient decided against further chemotherapy at this point due to declining performance status awaiting, discharged with hospice placement.    Attempted to see patient she was sleeping I did not wake her up discussed plan with patient's hospitalist team.  Hopefully will know more about placement today.  As per hospitalist team no significant change patient been comfortable.

## 2023-05-15 NOTE — PROGRESS NOTES
Care Management Follow Up    Expected Discharge Date: 05/16/2023     Concerns to be Addressed: Discharge planning      Patient plan of care discussed at interdisciplinary rounds: Yes    Anticipated Discharge Disposition: LTC     Anticipated Discharge Services:  Hospice    Patient/family educated on Medicare website which has current facility and service quality ratings:  Yes  Education Provided on the Discharge Plan:  Yes  Patient/Family in Agreement with the Plan:  Yes    Referrals Placed by CM/SW:  Skilled Nursing Facility  Private pay costs discussed: private room/amenity fees    Additional Information:  Previous referrals had all declined d/t bed availability. Additional referrals sent. [facility] in [place] has clinically accepted patient, private room available.     SW met with patient and cindy Hilton at bedside to discuss. They will discuss with family but are agreeable plan to discharge to facility tomorrow at this time. They are aware of the $9,000 deposit required.     SW discussed that [facility] has hospice contracts with [facility], [facility], and [facility]. Pt/family agreeable to using [facility]. SW will make referral today.     Family anticipates that they will be able to provide transport at discharge.     SW will continue to follow.     1315: Family has accepted bed at [facility] for tomorrow. [id number]. Family will transport at 10:30am tomorrow, 5/16. Facility updated. SW placed call to [facility] liaison Anjali, [phone]. Faxed initial Hospice referral to (f) [phone]. They will process referral and reach out to patient and cindy Hilton to coordinate admission.     [facility] needing nurse to nurse report called to [phone] prior to discharge. Orders to be faxed to (f) [phone].     SW will continue to follow.     TYREE Houser, [facility]   Inpatient Care Coordination  [facility]   [phone]

## 2023-05-15 NOTE — PROGRESS NOTES
CLINICAL NUTRITION SERVICES    Noted pt on comfort care.Plan to discharge on hospice. RD will not sign on at this time, unless consulted.

## 2023-05-15 NOTE — PLAN OF CARE
"Shift from [id number]     Inpatient Progress Note:  For complete assessment see flow sheet documentation.     /69 (BP Location: Left arm)   Pulse 109   Temp 98.5  F (36.9  C) (Oral)   Resp 16   Ht 1.651 m (5' 5\")   Wt 66.6 kg (146 lb 14.4 oz)   SpO2 97%   BMI 24.45 kg/m      Orientation: AO x4  Neuros: Intact  Pain status: Denies  Activity: Up with A1 with walker and gait belt  Resp: WDL  Cardiac: WDL  GI: WDL  : Voids spontaneously  LDA: PIV in place, Pt has 2 CRISTO drains on left side  Infusions: SL    Diet: Regular  Safety: Bed alarm on, gripper socks on when up, call light within reach, safety rounds completed  Discharge Plan: LTC with hospice when found    "

## 2023-05-16 NOTE — PLAN OF CARE
Goal Outcome Evaluation:       Patient's After Visit Summary was reviewed with patient and/or family- son Yobani.   Patient verbalized understanding of After Visit Summary, recommended follow up and was given an opportunity to ask questions.   Discharge medications sent home with patient/family: fax prescriptions to [facility]   Discharged with sonYobani    OBSERVATION patient END time: [id number]

## 2023-05-16 NOTE — PROGRESS NOTES
Noemí will be discharging to [facility] Hospice. Family will be transporting patient between 10-10:30 today. RN phone report was called and given to GHADA Riley at [facility]. All necessary discharge paperwork and any prescriptions will be faxed to the facility.

## 2023-05-16 NOTE — PROGRESS NOTES
[facility]    Hematology / Oncology f/u note.     Date of Admission:  5/9/2023    Assessment & Plan   Noemí Meza is a 83 year old female who was admitted on 5/9/2023. I was asked to see the patient for diagnosis of acute myeloid leukemia.    Assessment:  Plan:  1.  Acute myeloid leukemia; normal karyotype NPM1 mutated  -Patient had 1 cycle of chemotherapy with decitabine and venetoclax started on 3/22/23.  -Chemotherapy was done as an inpatient in [facility], several complications as detailed below, including diverticulitis perforation and abscess formation C. difficile colitis.  -Bone marrow biopsy after 1 cycle shows morphologic remission although NPM1 remains mutated.  AML is almost certain to relapse without further treatment.   Plan  -Ongoing discussion about continued chemotherapy versus transition to comfort based care.  -Patient has become very disabled with prolonged hospitalization, unable to stay in independent living.  Multiple infectious complications.   -5/11: Patient has decided no further chemotherapy, will pursue comfort based care.   -Would recommend minimizing vitals lab draws, especially nights ago patient can get good sleep.   -Patient is scheduled for discharge with hospice today.     2.  Diverticular abscess communicating with colon.  -Patient had another drain placed by IR at [facility] earlier this week.  Was being followed by ID at [facility] no longer plans to return there would like to transfer all care locally.   -Plan continue with antibiotics, ID consult if patient desires active treatment to guide treatment plan.   -We will hold off on ID consult but continue with antibiotics, Augmentin and oral vancomycin on discharge.     3.  Cytopenias  -Transfuse for hemoglobin less than 7 platelets less than 10    4.  Generalized weakness and failure to thrive  -Due to infectious complications acute leukemia.    5.  Atrial fibrillation.  -On anticoagulation with heparin.  -Would  hold anticoagulation if platelets drop below 50,000    6.  C. difficile colitis  -Patient had a course of oral vancomycin's recent stools samples were cleared of C. Difficile.  -Due to chemotherapy and ongoing antibiotics would keep on maintenance prophylactic dose vancomycin at 125 mg twice daily as per ID recommendation.     Kosta Barrett MD   [facility]  Office:  [phone]    Code Status    No CPR- Do NOT Intubate    Reason for Consult   Reason for consult:   Primary Care Physician   Physician No Ref-Primary    Chief Complaint   Generalized weakness      History of Present Illness   Noemí Meza is a 83 year old female who presents with generalized weakness  Patient has recent diagnosis of acute myeloid leukemia.  She had 1 course of chemotherapy done at [facility] as inpatient.  After discharge she decided to move her care to [place].  Patient has not restarted chemotherapy bone marrow biopsy after chemotherapy showed morphologic response but NPM1 remains mutated.  Patient is trying to find assisted living or nursing home placement.   Had another IR guided drain placed for diverticular abscess earlier this week.  Patient had some bleeding from her abscess drain.  Subsequently felt very weak appetite remains very poor unable to take care of herself.  Came to the ER.     5/11: Overall patient slept well she has some pain in her lower abdomen at the site of drain but feels the pain is manageable.  No nausea no shortness of breath.  Continues to remain fatigued oral intake is poor.   5/12 patient reported that overall she is doing well she slept well.  Denies any pain today awaiting placement at nursing home.   5/13: Overall stable, pain is minimal able to eat but appetite remains poor.  No fever.  Patient been sleeping quite a bit but reported that Interrupted in the night for vitals lab draws etc.    5/16: Patient is comfortable, has been sleeping well denies minimal pain from her drain.  Occasionally will  take Tylenol no fevers no nausea appetite has been decreased.  Getting discharged with hospice today to [place].     Hematologic chronology    March 2023: Patient worked at [employer] and lived in [place] for most of her life.  In early 2023 she moved to [place] to live closer to her children in a senior housing living.   3/7/2023: Patient saw Dr. Castillo Garcia with 1 month history of nausea anorexia weight loss.  Initial blood test showed that patient was anemic, her peripheral smear showed circulating blast.  White blood cell count was 9.9 at the time.  Platelets were normal.  3/13/2023 patient was seen for follow-up.  On clinical examination it was noted that she had atrial fibrillation.  She was advised to get admitted through the ER.  In the ER she was in A-fib with RVR heart rate ranging from 74 -113.  Creatinine was 1.06 hemoglobin 10.1, white blood cell count 12 MCV 97.8.  Patient was started on Eliquis for anticoagulation.  Metoprolol for rate control.  3/15/2023: Bone marrow biopsy showed acute myeloid leukemia with monocytic differentiation approximately 40% marrow blast. Normal karyotype NPM1 mutated.  Negative for flit 3 and IDH mutations.  Patient was transferred to inpatient service with plan to start treatment with decitabine and venetoclax.  Rivaroxaban was put on hold and she was started on Lovenox.    Chemotherapy started on 3/22/2023: Dose of venetoclax capped at 200.   Hospitalization was complicated with C. difficile colitis diagnosed on 4/6/2023, this was treated with oral vancomycin. 4/11/2023, patient developed left lower quadrant pain with fevers CT scan of abdomen pelvis moderate diverticulitis with small abscesses present.  Patient was treated with Zosyn.  Colorectal surgery was consulted they recommended against surgery and IR drain was placed.   Patient was seen by ID.  The purulent fluid aspirated from her abscess showed gram-positive bacilli along with hyphae and fungal  smear.  Patient was on Zosyn oral vancomycin and acyclovir we recommended adding caspofungin.  4/17/2023 patient had a bone marrow after 1 cycle of treatment which showed hypercellular bone marrow with pan hyperplasia morphologically unremarkable trilineage hematopoiesis no morphologic evidence of acute leukemia.  Patient was still positive for NPM 1 mutation.   5/2/2023 patient had drain check in IR, there was persistent communication of small diverticular abscess cavity with adjacent bowel.  On CT scan there was interval increase in the size of abscess along with the posterior lateral aspect of sigmoid colon now measures 3.5 x 2.3 cm.  Percutaneous left lower quadrant abdominal drain with tip terminating just anterior to the sigmoid colon.       Past Medical History   I have reviewed this patient's medical history and updated it with pertinent information if needed.   Past Medical History:   Diagnosis Date     Chronic atrial fibrillation (H)      HTN (hypertension)      Ulcerative colitis (H)        Past Surgical History   I have reviewed this patient's surgical history and updated it with pertinent information if needed.  No past surgical history on file.    Prior to Admission Medications   Prior to Admission Medications   Prescriptions Last Dose Informant Patient Reported? Taking?   XARELTO ANTICOAGULANT 20 MG TABS tablet 5/8/2023  Yes No   Sig: Take 20 mg by mouth daily (with dinner)   acyclovir (ZOVIRAX) 400 MG tablet 5/9/2023 at am  Yes No   Sig: Take 400 mg by mouth 2 times daily   amoxicillin-clavulanate (AUGMENTIN) 875-125 MG tablet 5/9/2023 at am  Yes No   Sig: Take 1 tablet by mouth every 12 hours   calcium carbonate-vitamin D (CALTRATE) 600-10 MG-MCG per tablet 5/9/2023  Yes No   Sig: Take 1 tablet by mouth 2 times daily   citalopram (CELEXA) 20 MG tablet 5/8/2023  Yes No   Sig: Take 20 mg by mouth At Bedtime   fluconazole (DIFLUCAN) 200 MG tablet unknown  Yes No   Sig: Take 200 mg by mouth daily HOLD  until neutropenic (neutrophils less than 1000)   levofloxacin (LEVAQUIN) 500 MG tablet 5/9/2023  Yes No   Sig: Take 500 mg by mouth daily   loperamide (IMODIUM) 2 MG capsule Unknown  Yes No   Sig: Take 2 mg by mouth 4 times daily as needed   mesalamine (LIALDA) 1.2 g DR tablet 5/9/2023  Yes No   Sig: Take 2 tablets by mouth daily   metoprolol tartrate (LOPRESSOR) 100 MG tablet 5/9/2023 at am  Yes No   Sig: Take 100 mg by mouth 2 times daily   ondansetron (ZOFRAN) 4 MG tablet   Yes No   Sig: Take 4 mg by mouth every 8 hours as needed   potassium chloride ER (K-TAB) 20 MEQ CR tablet 5/9/2023 at am  Yes No   Sig: Take 20 mEq by mouth 2 times daily   vancomycin (VANCOCIN) 125 MG capsule 5/9/2023 at am  Yes No   Sig: Take 125 mg by mouth   venetoclax (VENCLEXTA) 100 MG tablet on hold  Yes No   Sig: Take 200 mg by mouth      Facility-Administered Medications: None     Allergies   No Known Allergies    Social History   I have reviewed this patient's social history and updated it with pertinent information if needed. Noemí Meza      Family History   I have reviewed this patient's family history and updated it with pertinent information if needed.   Family History   Problem Relation Age of Onset     No Known Problems Mother      No Known Problems Father        Review of Systems   Review of system positive for weakness, some abdominal pain poor appetite    Physical Exam   Temp: 98.3  F (36.8  C) Temp src: Oral BP: 93/53 Pulse: (!) 124   Resp: 16 SpO2: 96 % O2 Device: None (Room air)    Vital Signs with Ranges  Temp:  [97.9  F (36.6  C)-98.3  F (36.8  C)] 98.3  F (36.8  C)  Pulse:  [101-124] 124  Resp:  [16-18] 16  BP: ()/(53-70) 93/53  SpO2:  [93 %-96 %] 96 %  145 lbs 14.4 oz    Constitutional: Awake, alert, cooperative, no apparent distress. ECOG PS 3      Data   Laboratory studies reviewed

## 2023-05-16 NOTE — DISCHARGE SUMMARY
[facility] Discharge Summary          Noemí Meza MRN# [id number]   Age: 83 year old YOB: 1940     Date of Admission:  5/9/2023  Date of Discharge::  5/16/2023  Admitting Physician:  Elvis Carrion MD  Discharge Physician:  Heather Sloan PA-C  Primary Physician: No Ref-Primary, Physician     Primary Discharge Diagnoses:   Generalized Weakness  Failure to thrive    Hospital Course:   For detail history, please refer to H & P from 5/9/2023. In brief, this is a 83 year old female with recent acute diverticulitis with hospitalization at [facility] s/p CT guided drain placement (second) for a diverticular abscess yesterday, recent diagnosis of AML, Ulcerative Colitis, afib, HTN, CKD, anxiety  who presents from assisted living with dehydration, failure to thrive, fatigue, and generalized weakness, needing more care. Anemia, s/p 1 unit PRBC.    Generalized Weakness  Failure to thrive    - patient was admitted to [facility] for 6 weeks with complicated diverticulitis    - recent diagnosis of AML    - lives independently in assisted living    - came to the hospital needing more care    - PT consult    - SW/CC : plan for hospice    - Pall care consulted and saw on 5/12, planning for comfort care and hospice on discharge     Acute on chronic Anemia  Acute Myeloid Leukemia  Neutropenia    - s/p 1 unit PRBC on 5/9    - hgb stable 5/13 8.0    - no further lab orders per discussion with Oncology as pt is now comfort based approach    - CBC at recent stay at [facility] noted blasts    - bone marrow biopsy on 3/15 revealed Acute Myeloid Leukemia with Monocytic Differentiation with 40% marrow blasts, normal karyotype, FLT3, IDH1 and IDH2 negative    - 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    - Patient had 1 cycle of chemotherapy with decitabine and venetoclax started on 3/22/23.     - Chemotherapy was done as an inpatient in [facility], including diverticulitis perforation and abscess formation C. difficile  "colitis.     - Bone marrow biopsy after 1 cycle shows morphologic remission although NPM1 remains mutated.  AML is almost certain to relapse without further treatment.      -  Oncology following and Patient has decided no further chemotherapy, will pursue comfort based care.     Colitis Ulcerative Chronic (HCC)    - stable    - continue home mesalamine 2.4 g daily     Recent acute diverticulitis    - has had one drain placed for abscess previously by IR at [facility]    - second drain placed 5/8    - plan to discharge with drains in place    - cont oral augmentin    - orders for drain flushes      Atrial Fibrillation (HCC)  Long Term (Current) Anticoagulant Treatment    - patient's pta Metoprolol dose was held due to low bps.  Pt felt symptomatic palpitations and requested to resume.  Lopressor was resumed at low dose and may be titrated prn.    - holding home Xarelto 20 mg daily at this time due to acute on chronic anemia.  Discussed with Heme/Onc and this was discontinued upon discharge.    Chronic Kidney Disease   (CKD) Stage 3a     - stable     HTN    - pta hydrochlorothiazide was discontinued.     Anxiety Generalized Disorder    - continue home citalopram 10 mg daily     Severe protein malnutrition    - poor PO intake due to lack of appetite    - likely due to underlying illnesses    - added supplements     Hx C. difficile colitis    - due to chemotherapy and ongoing antibiotics pt is on maintenance prophylactic dose vancomycin at 125 mg twice daily as per ID recommendation    Allergies:   No Known Allergies     Subjective:   Patient lying in bed.  No new questions today.  Feels ready to discharge.    Physical Exam:   Blood pressure 109/66, pulse 101, temperature 97.9  F (36.6  C), temperature source Oral, resp. rate 18, height 1.651 m (5' 5\"), weight 66.2 kg (145 lb 14.4 oz), SpO2 93 %.  General: Alert, interactive, NAD, lying in bed, pleasant and cooperative.    HEENT: AT/NC  Resp: clear to auscultation " bilaterally, no crackles or wheezes  Cardiac: regular rate and rhythm, no murmur  Abdomen: Soft, nontender, nondistended. +BS.  No rebound or guarding.  Two guille drains in place with scant output.  Extremities: No LE edema  Skin: Warm and dry  Neuro: Alert & oriented x 3, moves all extremities equally   Discharge Medicatios:        Discharge Medication List as of 5/16/2023  9:59 AM      START taking these medications    Details   acetaminophen (TYLENOL) 325 MG tablet Take 3 tablets (975 mg) by mouth every 4 hours as needed for mild pain, Disp-30 tablet, R-0, Transitional      acetaminophen (TYLENOL) 650 MG suppository Place 1 suppository (650 mg) rectally every 4 hours as needed for fever, Disp-2 suppository, R-0, Transitional      atropine 1 % ophthalmic solution Place 2 drops under the tongue every 2 hours as needed for secretions, Disp-2 mL, R-0, Transitional      bisacodyl (DULCOLAX) 10 MG suppository Place 1 suppository (10 mg) rectally once as needed for other (for no bowel movement for 72 hours), Disp-2 suppository, R-0, Transitional      haloperidol (HALDOL) 0.5 MG tablet Take 1 tablet (0.5 mg) by mouth every 6 hours as needed for agitation, Disp-15 tablet, R-0, Transitional      ondansetron (ZOFRAN ODT) 4 MG ODT tab Take 1 tablet (4 mg) by mouth every 6 hours as needed for nausea or vomiting, Disp-15 tablet, R-0, Transitional         CONTINUE these medications which have CHANGED    Details   amoxicillin-clavulanate (AUGMENTIN) 875-125 MG tablet Take 1 tablet by mouth every 12 hours, Transitional      citalopram (CELEXA) 20 MG tablet Take 1 tablet (20 mg) by mouth At Bedtime, Transitional      LORazepam (ATIVAN) 0.5 MG tablet Place 1 tablet (0.5 mg) under the tongue every 4 hours as needed for anxiety, Disp-15 tablet, R-0, Local Print      mesalamine (LIALDA) 1.2 g DR tablet Take 2 tablets (2.4 g) by mouth daily, Transitional      metoprolol tartrate (LOPRESSOR) 25 MG tablet Take 0.5 tablets (12.5 mg) by mouth  2 times daily Hold for sbp<110, HR<60, Transitional      morphine sulfate (ROXANOL) 20 mg/mL (HIGH CONC) soln Take 0.125 mLs (2.5 mg) by mouth every 2 hours as needed for shortness of breath (pain), Disp-30 mL, R-0, Local Print      vancomycin (VANCOCIN) 125 MG capsule Take 1 capsule (125 mg) by mouth 2 times daily, Transitional         STOP taking these medications       acyclovir (ZOVIRAX) 400 MG tablet Comments:   Reason for Stopping:         calcium carbonate-vitamin D (CALTRATE) 600-10 MG-MCG per tablet Comments:   Reason for Stopping:         fluconazole (DIFLUCAN) 200 MG tablet Comments:   Reason for Stopping:         levofloxacin (LEVAQUIN) 500 MG tablet Comments:   Reason for Stopping:         loperamide (IMODIUM) 2 MG capsule Comments:   Reason for Stopping:         ondansetron (ZOFRAN) 4 MG tablet Comments:   Reason for Stopping:         potassium chloride ER (K-TAB) 20 MEQ CR tablet Comments:   Reason for Stopping:         venetoclax (VENCLEXTA) 100 MG tablet Comments:   Reason for Stopping:         XARELTO ANTICOAGULANT 20 MG TABS tablet Comments:   Reason for Stopping:               Instructions Given to Patient as Discharge:     Discharge Procedure Orders   Hospice Referral   Standing Status: Future   Referral Priority: Routine: Next available opening Referral Type: Consultation   Number of Visits Requested: 1     General info for SNF   Order Comments: Length of Stay Estimate: Long Term Care  Condition at Discharge: Stable  Level of care:board and care  Rehabilitation Potential: Poor  Admission H&P remains valid and up-to-date: Yes  Recent Chemotherapy: N/A  Use Nursing Home Standing Orders: Yes     Mantoux instructions   Order Comments: Give two-step Mantoux (PPD) Per Facility Policy Yes     Follow Up and recommended labs and tests   Order Comments: Follow up with Oncology as needed.     Reason for your hospital stay   Order Comments: You were hospitalized due to weakness. You met with Oncology and  Palliative Care and decided to discharge on hospice cares     Activity - Up with nursing assistance     Order Specific Question Answer Comments   Is discharge order? Yes      Wound care (specify)   Order Comments: Site:  Two abdominal CRISTO drains  Instructions:  irrigate with 10ml normal saline bid     Contact Isolation   Order Comments: Neurtropenic precautions     Fall precautions     Diet   Order Comments: Follow this diet upon discharge: Orders Placed This Encounter      Snacks/Supplements Adult: Magic Cup; With Meals      Combination Diet Regular Diet Adult     Order Specific Question Answer Comments   Is discharge order? Yes        Pending Tests at Discharge:   none    Discharge Disposition:     Discharged to [facility]     Heather Sloan MS, PA-C  Hospitalist Service  Pager [phone]    >30 minutes was spent in discharge planning, care coordination, physical examination and medication reconciliation on the date of discharge, 5/16/2023

## 2023-05-16 NOTE — PLAN OF CARE
"/65 (BP Location: Right arm)   Pulse 105   Temp 98.5  F (36.9  C) (Oral)   Resp 16   Ht 1.651 m (5' 5\")   Wt 66.2 kg (145 lb 14.4 oz)   SpO2 98%   BMI 24.28 kg/m      Neuro: A&O x4  Cardiac: Had one episode of palpitations while sitting in the recliner this afternoon, provider was notified   Lungs: WDL, on ra, denies SOB   GI: intermittent LLQ pain, tylenol effective per pt. Had loose stool on this shift   : voiding spontaneously   Pain: 2/10 in LLQ declined interventions   IV: SL  Meds: tolerates oral medication well. On PO abx  Diet: regular diet, tolerating well  Activity: Assist x1 with walker   Misc: Patient seen by palliative care, moved to comfort care. Hem/ Onc following   Plan: [facility] 5/16/23 with family transport      Currently resting in bed, able to make need known. Family at bedside.     "

## 2023-05-16 NOTE — PLAN OF CARE
"[id number]    Inpatient Progress Note:  A & O X 4. Pleasant . Lung sound clear bilaterally to auscultation. Denies pain, shortness of breath, nausea, vomit, or diarrhea. Admitting DX: Generalized weakness, Failure to thrive , Diverticulitis. PIV saline lock. CRISTO drains X 2 with zero output. Patient on scheduled Lovenox. Patient discharging to [facility] H[facility] today at 10:30 AM. Family to transport.  /68 (BP Location: Left arm)   Pulse 118   Temp 98  F (36.7  C) (Oral)   Resp 18   Ht 1.651 m (5' 5\")   Wt 66.2 kg (145 lb 14.4 oz)   SpO2 95%   BMI 24.28 kg/m     Will continue to provide supportive cares.   Colette Barnes RN   "

## 2023-05-16 NOTE — PLAN OF CARE
"[id number]    Inpatient Progress Note:    /68 (BP Location: Left arm)   Pulse 118   Temp 98  F (36.7  C) (Oral)   Resp 18   Ht 1.651 m (5' 5\")   Wt 66.2 kg (145 lb 14.4 oz)   SpO2 95%   BMI 24.28 kg/m       Pt Aox4. Pleasant. Denies pain. Assist of 1 with walker and gait belt. PIV SL. CRISTO drains to left lower abdomen, dressings CDI. Flushed drain #2 with 5ml of NS, output 2.5ml of dark reddish brown drainage. No output from drain #1. Plan: discharge to [facility] with hospice tomorrow 5/16 at 1030, family to transport. Will continue to monitor and provide supportive cares.    "

## 2023-07-06 NOTE — TELEPHONE ENCOUNTER
Patient has orders to eval and treat two current abscess drains that were placed at [facility].  Order for CT sinogram for next week per Dr Aiken to assess drains.  Minimal output per care facility.

## 2023-07-12 NOTE — IP AVS SNAPSHOT
[facility]  [address]  [facility] [address]  Phone: [phone]  Fax: [phone]                              After Visit Summary   7/12/2023    Noemí Meza   MRN: [id number]           After Visit Summary Signature Page    I have received my discharge instructions, and my questions have been answered. I have discussed any challenges I see with this plan with the nurse or doctor.    ..........................................................................................................................................  Patient/Patient Representative Signature      ..........................................................................................................................................  Patient Representative Print Name and Relationship to Patient    ..................................................               ................................................  Date                                   Time    ..........................................................................................................................................  Reviewed by Signature/Title    ...................................................              ..............................................  Date                                               Time    22EPIC Rev 08/18

## 2023-07-12 NOTE — PROGRESS NOTES
Patient here for a CT sinogram.  Two drains injected.  Dr Ayala reviewed imaging and per his recommendation the drains should remain in place, and current drain flushing of the left and not right drain should continue. Due to complexity of imaging he also recommends that patient should have a surgical consult with [facility] as she had her drains placed there.  Dr Patel asked to review imaging and she would see patient as out patient consult.  New Stay Fix dressings and JPs were applied. Silver nitrate applied to proud flesh at midline drain. Patient and daughter were given the above information.  Total nursing time: 2.5 hours

## 2023-07-12 NOTE — DISCHARGE INSTRUCTIONS
You will need a surgical consult with a colorectal physician either at [facility] or other facility Per Dr Ayala. Dr Patel at [facility] would be happy to do a consult but recommends [facility] with your complex case, as well as Dr Ayala.    Continue current drain cares.    Change dressings if they become soiled or weekly.  New CRISTO bulbs and Stay Fix were applied today.  Please feel free to call nursing for additonal questions [phone].  Lindsay Maldonado, RN, BSN  Dr. Suleiman Ayala    
negative...

## 2023-07-12 NOTE — IP AVS SNAPSHOT
After Visit Summary Template Not Found    This Print Group is only intended to be used in the After Visit Summary and can only be used in a report that uses a released After Visit Summary Template.                       MRN:[id number]                      After Visit Summary   7/12/2023    Noemí Meza   MRN: [id number]           Visit Information        Provider Department      7/12/2023 10:00 AM  IMAGING NURSE;  RAD; [facility]           Review of your medicines      UNREVIEWED medicines. Ask your doctor about these medicines       Dose / Directions   * acetaminophen 325 MG tablet  Commonly known as: TYLENOL  Used for: Hospice care patient      Dose: 975 mg  Take 3 tablets (975 mg) by mouth every 4 hours as needed for mild pain  Quantity: 30 tablet  Refills: 0     * acetaminophen 650 MG suppository  Commonly known as: TYLENOL  Used for: Hospice care patient      Dose: 650 mg  Place 1 suppository (650 mg) rectally every 4 hours as needed for fever  Quantity: 2 suppository  Refills: 0     amoxicillin-clavulanate 875-125 MG tablet  Commonly known as: AUGMENTIN  Used for: Diverticulitis of colon      Dose: 1 tablet  Take 1 tablet by mouth every 12 hours  Refills: 0     atropine 1 % ophthalmic solution  Used for: Hospice care patient      Dose: 2 drop  Place 2 drops under the tongue every 2 hours as needed for secretions  Quantity: 2 mL  Refills: 0     bisacodyl 10 MG suppository  Commonly known as: DULCOLAX  Used for: Hospice care patient      Dose: 10 mg  Place 1 suppository (10 mg) rectally once as needed for other (for no bowel movement for 72 hours)  Quantity: 2 suppository  Refills: 0     citalopram 20 MG tablet  Commonly known as: celeXA  Used for: Mixed anxiety depressive disorder      Dose: 20 mg  Take 1 tablet (20 mg) by mouth At Bedtime  Refills: 0     haloperidol 0.5 MG tablet  Commonly known as: HALDOL  Used for: Hospice care patient      Dose: 0.5 mg  Take 1 tablet (0.5 mg)  by mouth every 6 hours as needed for agitation  Quantity: 15 tablet  Refills: 0     LORazepam 0.5 MG tablet  Commonly known as: ATIVAN  Used for: Hospice care patient      Dose: 0.5 mg  Place 1 tablet (0.5 mg) under the tongue every 4 hours as needed for anxiety  Quantity: 15 tablet  Refills: 0     mesalamine 1.2 g DR tablet  Commonly known as: LIALDA  Used for: Chronic ulcerative colitis without complication, unspecified location (H)      Dose: 2.4 g  Take 2 tablets (2.4 g) by mouth daily  Refills: 0     metoprolol tartrate 25 MG tablet  Commonly known as: LOPRESSOR  Used for: Permanent atrial fibrillation (H)      Dose: 12.5 mg  Take 0.5 tablets (12.5 mg) by mouth 2 times daily Hold for sbp<110, HR<60  Refills: 0     morphine sulfate 20 mg/mL (HIGH CONC) soln  Commonly known as: ROXANOL  Used for: Hospice care patient      Dose: 2.5 mg  Take 0.125 mLs (2.5 mg) by mouth every 2 hours as needed for shortness of breath (pain)  Quantity: 30 mL  Refills: 0     ondansetron 4 MG ODT tab  Commonly known as: ZOFRAN ODT  Used for: Hospice care patient      Dose: 4 mg  Take 1 tablet (4 mg) by mouth every 6 hours as needed for nausea or vomiting  Quantity: 15 tablet  Refills: 0     vancomycin 125 MG capsule  Commonly known as: VANCOCIN  Used for: Diverticulitis of colon      Dose: 125 mg  Take 1 capsule (125 mg) by mouth 2 times daily  Refills: 0         * This list has 2 medication(s) that are the same as other medications prescribed for you. Read the directions carefully, and ask your doctor or other care provider to review them with you.                  Protect others around you: Learn how to safely use, store and throw away your medicines at www.disposemymeds.org.       Follow-ups after your visit       Care Instructions       Further instructions from your care team       You will need a surgical consult with a colorectal physician either at [facility] or other facility Per Dr Ayala. Dr Patel at [facility] would  "be happy to do a consult but recommends [facility] with your complex case, as well as Dr Ayala.    Continue current drain cares.    Change dressings if they become soiled or weekly.  New CRISTO bulbs and Stay Fix were applied today.  Please feel free to call nursing for additonal questions [phone].  Lindsay Maldonado RN, BSN  Dr. Suleiman Ayala      Additional Information About Your Visit       [employer] Information    [employer] lets you send messages to your doctor, view your test results, renew your prescriptions, schedule appointments and more. To sign up, go to [email or web address] . Click on \"Log in\" on the left side of the screen, which will take you to the Welcome page. Then click on \"Sign up Now\" on the right side of the page.     You will be asked to enter the access code listed below, as well as some personal information. Please follow the directions to create your username and password.     Your access code is: [email or web address]  Expires: 9/10/2023 12:56 PM     Your access code will  in 60 days. If you need help or a new code, please call your   [facility] clinic or [phone].       Care EveryWhere ID    This is your Care EveryWhere ID. This could be used by other organizations to access your [facility] medical records  [id number]        Primary Care Provider  Provider Not In System      Equal Access to Services    [facility] : Hadii aad ku hadasho Soomaali, waaxda luqadaha, qaybta kaalmada adeegyada, chris xiong . So [facility] [phone].    ATENCIÓN: Si habla español, tiene a velasquez disposición servicios gratuitos de asistencia lingüística. Llame al [phone].    We comply with applicable federal and state civil rights laws, including the [place] Human Rights Act. We do not discriminate on the basis of race, color, creed, Baptist, national origin, marital status, age, disability, sex, sexual orientation, or gender identity.    If you would like an " itemization of your charges they will now be available in [employer] 30 days after discharge. To access the itemized statements in [employer] go to billing/billing summary. From there select view account. There will be multiple tabs showing an overview of your account, detail, payments, and communications. From the communications tab you can see your monthly statements, your itemized statements, and any billing letters generated for your account. If you do not have a [employer] account and need help getting access please contact [employer] support at [phone].  If you would prefer to have your itemized statements mailed please contact our automated itemized bill request line at [phone] option  2.       Thank you!    Thank you for choosing [facility] for your care. Our goal is always to provide you with excellent care. Hearing back from our patients is one way we can continue to improve our services. Please take a few minutes to complete the written survey that you may receive in the mail after you visit. If you would like to speak to someone directly about your visit please contact Patient Relations at [phone]. Thank you!              Medication List      ASK your doctor about these medications          Morning Afternoon Evening Bedtime As Needed    * acetaminophen 325 MG tablet  Also known as: TYLENOL  INSTRUCTIONS: Take 3 tablets (975 mg) by mouth every 4 hours as needed for mild pain                     * acetaminophen 650 MG suppository  Also known as: TYLENOL  INSTRUCTIONS: Place 1 suppository (650 mg) rectally every 4 hours as needed for fever                     amoxicillin-clavulanate 875-125 MG tablet  Also known as: AUGMENTIN  INSTRUCTIONS: Take 1 tablet by mouth every 12 hours                     atropine 1 % ophthalmic solution  INSTRUCTIONS: Place 2 drops under the tongue every 2 hours as needed for secretions                     bisacodyl 10 MG suppository  Also known as:  DULCOLAX  INSTRUCTIONS: Place 1 suppository (10 mg) rectally once as needed for other (for no bowel movement for 72 hours)                     citalopram 20 MG tablet  Also known as: celeXA  INSTRUCTIONS: Take 1 tablet (20 mg) by mouth At Bedtime                     haloperidol 0.5 MG tablet  Also known as: HALDOL  INSTRUCTIONS: Take 1 tablet (0.5 mg) by mouth every 6 hours as needed for agitation                     LORazepam 0.5 MG tablet  Also known as: ATIVAN  INSTRUCTIONS: Place 1 tablet (0.5 mg) under the tongue every 4 hours as needed for anxiety                     mesalamine 1.2 g DR tablet  Also known as: LIALDA  INSTRUCTIONS: Take 2 tablets (2.4 g) by mouth daily                     metoprolol tartrate 25 MG tablet  Also known as: LOPRESSOR  INSTRUCTIONS: Take 0.5 tablets (12.5 mg) by mouth 2 times daily Hold for sbp<110, HR<60                     morphine sulfate 20 mg/mL (HIGH CONC) soln  Also known as: ROXANOL  INSTRUCTIONS: Take 0.125 mLs (2.5 mg) by mouth every 2 hours as needed for shortness of breath (pain)                     ondansetron 4 MG ODT tab  Also known as: ZOFRAN ODT  INSTRUCTIONS: Take 1 tablet (4 mg) by mouth every 6 hours as needed for nausea or vomiting                     vancomycin 125 MG capsule  Also known as: VANCOCIN  INSTRUCTIONS: Take 1 capsule (125 mg) by mouth 2 times daily                        * This list has 2 medication(s) that are the same as other medications prescribed for you. Read the directions carefully, and ask your doctor or other care provider to review them with you.